# Patient Record
Sex: MALE | Race: BLACK OR AFRICAN AMERICAN | Employment: FULL TIME | URBAN - METROPOLITAN AREA
[De-identification: names, ages, dates, MRNs, and addresses within clinical notes are randomized per-mention and may not be internally consistent; named-entity substitution may affect disease eponyms.]

---

## 2022-05-27 ENCOUNTER — OFFICE VISIT (OUTPATIENT)
Dept: FAMILY MEDICINE CLINIC | Age: 32
End: 2022-05-27
Payer: COMMERCIAL

## 2022-05-27 VITALS
HEART RATE: 74 BPM | BODY MASS INDEX: 17.89 KG/M2 | WEIGHT: 114 LBS | DIASTOLIC BLOOD PRESSURE: 74 MMHG | SYSTOLIC BLOOD PRESSURE: 120 MMHG | HEIGHT: 67 IN

## 2022-05-27 DIAGNOSIS — R10.9 ABDOMINAL CRAMPING: ICD-10-CM

## 2022-05-27 DIAGNOSIS — Z13.1 SCREENING FOR DIABETES MELLITUS: ICD-10-CM

## 2022-05-27 DIAGNOSIS — Z78.9 VEGAN DIET: ICD-10-CM

## 2022-05-27 DIAGNOSIS — R10.13 EPIGASTRIC PAIN: Primary | ICD-10-CM

## 2022-05-27 DIAGNOSIS — Z13.220 SCREENING CHOLESTEROL LEVEL: ICD-10-CM

## 2022-05-27 DIAGNOSIS — Z57.9 OCCUPATIONAL EXPOSURE IN WORKPLACE: ICD-10-CM

## 2022-05-27 DIAGNOSIS — R53.83 FATIGUE, UNSPECIFIED TYPE: ICD-10-CM

## 2022-05-27 PROBLEM — D57.1 SICKLE CELL DISEASE WITHOUT CRISIS (HCC): Status: ACTIVE | Noted: 2022-05-27

## 2022-05-27 PROBLEM — D57.1 SICKLE CELL DISEASE WITHOUT CRISIS (HCC): Status: RESOLVED | Noted: 2022-05-27 | Resolved: 2022-05-27

## 2022-05-27 PROCEDURE — 1036F TOBACCO NON-USER: CPT | Performed by: FAMILY MEDICINE

## 2022-05-27 PROCEDURE — G8427 DOCREV CUR MEDS BY ELIG CLIN: HCPCS | Performed by: FAMILY MEDICINE

## 2022-05-27 PROCEDURE — G8419 CALC BMI OUT NRM PARAM NOF/U: HCPCS | Performed by: FAMILY MEDICINE

## 2022-05-27 PROCEDURE — 99205 OFFICE O/P NEW HI 60 MIN: CPT | Performed by: FAMILY MEDICINE

## 2022-05-27 SDOH — HEALTH STABILITY - PHYSICAL HEALTH: OCCUPATIONAL EXPOSURE TO UNSPECIFIED RISK FACTOR: Z57.9

## 2022-05-27 ASSESSMENT — PATIENT HEALTH QUESTIONNAIRE - PHQ9
SUM OF ALL RESPONSES TO PHQ QUESTIONS 1-9: 0
2. FEELING DOWN, DEPRESSED OR HOPELESS: 0
SUM OF ALL RESPONSES TO PHQ9 QUESTIONS 1 & 2: 0
1. LITTLE INTEREST OR PLEASURE IN DOING THINGS: 0
SUM OF ALL RESPONSES TO PHQ QUESTIONS 1-9: 0

## 2022-05-27 NOTE — PATIENT INSTRUCTIONS
PLEASE BRING YOUR MEDICATIONS TO ALL APPOINTMENTS      Please check MY CHART for test results. If you have forgotten your password, please call [phone]. The diagnoses and medications listed in this after visit summary may not be up to date. Please check MY CHART in 28-48 hours for possible corrections. Late cancellation policy: So that we can better accommodate people who are sick, please give our office 24 hour notice for an appointment cancellation. Missed appointments: Your care is very important to us. It is important that you keep your scheduled appointments. Multiple missed appointments will lead to a dismissal from the office. Patients arriving late will be worked into the schedule as time permits, with patients arriving on time taken as scheduled. Late arriving patients are more than welcome to wait or reschedule their appointments. Please allow 5-7 business days for paperwork to be processed. HERE ARE SOME LIFE CHANGING TIPS      1. Take your blood pressure medications at bedtime to reduce your chance of heart attack or stroke  2. Fever in kids:  Give both Tylenol and Ibuprofen at the same time rather than staggering them   3. Follow these tips to reduce childhood obesity: Reduce unnecessary exposure to antibiotics, consume whole milk instead of skim milk, watch public TV instead of regular TV (less exposure to junk food commercials), and reduce traumatic experiences. 4. 1 egg per day is good for your heart  5. Alternate day fasting does promote weight loss. Skipping breakfast increases your risk of obesity  6. Artificially sweetened drinks increase all cause mortality (strokes, body mass index, cardiovascular disease)  7. Kale consumption can reduce onset of dementia  8. Walking at least 8000 steps per day and resistance exercise 2-3 x per week are good for your heart  9. Brushing teeth 3 times per day can decrease chance of getting diabetes  10.  Antibiotic use is associated with a lifetime increased risk of breast cancer and heart disease.

## 2022-05-27 NOTE — PROGRESS NOTES
Patient ID: Trudy Clarke 1990    . Chief Complaint   Patient presents with   [address]     concerns about sickle cell, exposed to a chemical    Other     [facility]  did not bring in form    Fatigue         HPI     Fatigue: Ongoing for years. States he was exposed to chemicals at work in 2016. Since then he has been tired. His case was not accepted through [employer]. He has since gone to a detox center and he thinks this helped him with his fatigue. 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 Second product he was exposed to is Cidex  Opa solution (active ingredient ortho-phthaldehyde). See material safety data sheet scanned. GI problem has had some GI discomfort and cramping associated with some shortness of breath. He started taking probiotics about 2 weeks ago and his symptoms have resolved. Concern for sickle cell trait: He wonders if he has sickle cell. Neither his parents nor grandparents have sickle cell. Review of Systems    Patient Active Problem List   Diagnosis   (none) - all problems resolved or deleted       No past surgical history on file. Family History   Problem Relation Age of Onset    Hypertension Father     Asthma Maternal Grandmother        No current outpatient medications on file prior to visit. No current facility-administered medications on file prior to visit. Objective:         Physical Exam  Constitutional:       General: He is not in acute distress. Appearance: He is underweight. HENT:      Head: Normocephalic and atraumatic. Right Ear: Hearing, tympanic membrane and external ear normal.      Left Ear: Hearing, tympanic membrane and external ear normal.      Nose: No mucosal edema or rhinorrhea.       Mouth/Throat:      Pharynx: No oropharyngeal exudate or posterior oropharyngeal erythema. Tonsils: No tonsillar abscesses. Eyes:      General: Lids are normal.      Conjunctiva/sclera: Conjunctivae normal.   Neck:      Thyroid: No thyromegaly. Trachea: No tracheal deviation. Cardiovascular:      Rate and Rhythm: Normal rate and regular rhythm. Heart sounds: Normal heart sounds, S1 normal and S2 normal. No murmur heard. No gallop. Pulmonary:      Effort: Pulmonary effort is normal. No respiratory distress. Breath sounds: Normal breath sounds. No wheezing or rales. Abdominal:      Palpations: Abdomen is soft. There is no mass. Tenderness: There is no abdominal tenderness. Musculoskeletal:      Right lower leg: No edema. Left lower leg: No edema. Skin:     General: Skin is warm and dry. Neurological:      Mental Status: He is oriented to person, place, and time. Psychiatric:         Speech: Speech normal.         Behavior: Behavior normal.         Thought Content: Thought content normal.       Vitals:    05/27/22 1118   BP: 120/74   Site: Left Upper Arm   Position: Sitting   Cuff Size: Medium Adult   Pulse: 74   Weight: 114 lb (51.7 kg)   Height: 5' 7\" (1.702 m)     Body mass index is 17.85 kg/m². Wt Readings from Last 3 Encounters:   05/27/22 114 lb (51.7 kg)     BP Readings from Last 3 Encounters:   05/27/22 120/74          No results found for this visit on 05/27/22. The ASCVD Risk score (Andres Olson, et al., 2013) failed to calculate for the following reasons: The 2013 ASCVD risk score is only valid for ages 36 to 78  Lab Review   No results found for any previous visit. Assessment:       Diagnosis Orders   1. Epigastric pain     2. Occupational exposure in workplace     3. Abdominal cramping     4. Screening cholesterol level  Lipid Panel   5. Screening for diabetes mellitus  Glucose, Fasting    Hemoglobin A1C   6.  Fatigue, unspecified type  Vitamin B12 & Folate    Vitamin D 25 Hydroxy    TSH with Reflex    Comprehensive Metabolic Panel    CBC with Auto Differential   7. Vegan diet             Plan:      If his symptoms are improving with the probiotics, then continue taking them. Since this just been a short time since he has been taking probiotics, we can check him again in 3 to 4 months to see if he still feeling better. I did review his emergency room visits at [facility] and they were reassuring    I reviewed the material safety data sheets. What ever exposure he had would have been a long time ago when if he was having respiratory symptoms he should have removed himself from the exposure at that time. There is no mention of long-term effects of exposure in the material safety data sheet    I reviewed the hair sample readings. I am not with familiar with how to interpret this type of information. However nothing is registering as high in regards to the toxic elements. Patient is of [place] ethnicity and is vegan and is rather thin. I may discuss nutritional challenges at her next visit. Time to see patient and review the information he brought in his 1 hour.               [unfilled]

## 2022-08-12 ENCOUNTER — OFFICE VISIT (OUTPATIENT)
Dept: FAMILY MEDICINE CLINIC | Age: 32
End: 2022-08-12
Payer: COMMERCIAL

## 2022-08-12 VITALS
BODY MASS INDEX: 17.2 KG/M2 | OXYGEN SATURATION: 98 % | WEIGHT: 109.8 LBS | TEMPERATURE: 98.9 F | DIASTOLIC BLOOD PRESSURE: 58 MMHG | SYSTOLIC BLOOD PRESSURE: 102 MMHG | HEART RATE: 84 BPM

## 2022-08-12 DIAGNOSIS — G89.29 CHRONIC ABDOMINAL PAIN: Primary | ICD-10-CM

## 2022-08-12 DIAGNOSIS — R10.9 CHRONIC ABDOMINAL PAIN: Primary | ICD-10-CM

## 2022-08-12 DIAGNOSIS — Z78.9 VEGAN DIET: ICD-10-CM

## 2022-08-12 DIAGNOSIS — R63.4 UNINTENTIONAL WEIGHT LOSS: ICD-10-CM

## 2022-08-12 PROCEDURE — G8427 DOCREV CUR MEDS BY ELIG CLIN: HCPCS | Performed by: PHYSICIAN ASSISTANT

## 2022-08-12 PROCEDURE — 1036F TOBACCO NON-USER: CPT | Performed by: PHYSICIAN ASSISTANT

## 2022-08-12 PROCEDURE — 99214 OFFICE O/P EST MOD 30 MIN: CPT | Performed by: PHYSICIAN ASSISTANT

## 2022-08-12 PROCEDURE — G8419 CALC BMI OUT NRM PARAM NOF/U: HCPCS | Performed by: PHYSICIAN ASSISTANT

## 2022-08-12 NOTE — PROGRESS NOTES
[address] [facility] [address]    Chief Complaint   Patient presents with    Abdominal Pain     Upper stomach area     Shortness of Breath       HPI  History was obtained from patient and his wife. This is my first time seeing patient. He is being seen today at [facility]. His PCP is Dr. Lonny Swanson. Safia Armenta is a 27 y.o. male who presents today with complaints of abdominal pain for approximately 3 months. He states that this discomfort is fairly constant. He notices it more prior to meals. He states \"if I do not eat at my normal time, the pain is worse. \"  It also seems worse when he takes any vitamins or supplements so he has stopped all of those entirely, including the most recent probiotic that he started a few months ago. He describes the pain as \"spastic, squeezing and is if stool is moving and then this causes contraction and as is if my breathing is restricted and presses on my lungs and causes me to be short of breath. \"  He points to the epigastric region of the abdomen and states that is where this spastic and squeezing sensation presents itself. He states he is swallowing foods fine. Solids and liquids are not getting stuck in the esophagus. He denies heartburn, increased belching or flatulence. He denies nausea, vomiting, diarrhea, constipation, melena, hematochezia. He admits unintentional weight loss. EMR reviewed. He has lost 5 pounds since his last office visit. 114 pounds in May, 109 pounds today. BMI of 17. His wife states that he eats normal to large amounts of food each day so she does not understand why he continues to lose weight. He is [place] and follows a vegan diet. He has tried to incorporate fish into his diet but it seems to make it worse. He denies URI type symptoms such as nasal congestion, postnasal drip or sore throat. He denies cough, hemoptysis, myalgias or arthralgias, night sweats or fevers.   He has never smoked cigarettes. He denies alcohol use, or illicit drug use. He denies Tylenol, aspirin, NSAID use. He states that he used to have a history of elevated B12 which caused his legs to shake. He stopped taking B12 approximately 3 years ago. He has taken his blood pressure sometimes when at home. He states that when he is sitting, it is sometimes 90/50 but then when he stands up it is elevated. When he feels abdominal pain and lung restriction, he stands up and this also seems to improve his breathing and his pain. 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 He states that he was able to push on this area and make the bulge go away. This helped him feel better. He questions if he could have a hernia. He states he was very fatigued last week. He states \"I have a constant spitting problem that started in 2019. \"  He constantly spits into a cup during the office visit. He states that sometimes his breath smells sweet. He has been monitoring his blood sugars at home. Fasting sugars in the 80s.  2 hours postprandial 170s. Denies polyuria or polydipsia. Normal urine stream.  No dysuria. He had COVID-19 approximately 1 month ago, recovered well. He states he has never had abdominal imaging. He states he has never had an EGD or colonoscopy. No known family history of cancer. Admits anxiousness. No SI/HI. PAST MEDICAL HISTORY  History reviewed. No pertinent past medical history.     FAMILY HISTORY  Family History   Problem Relation Age of Onset    Hypertension Father     Asthma Maternal Grandmother        SOCIAL HISTORY  Social History     Socioeconomic History    Marital status:      Spouse name: None    Number of children: None    Years of education: None    Highest education level: None   Tobacco Use    Smoking status: Never    Smokeless tobacco: Never   Substance and Sexual Activity    Alcohol use: Never    Drug use: Never        SURGICAL HISTORY  History reviewed. No pertinent surgical history. CURRENT MEDICATIONS  No current outpatient medications on file. No current facility-administered medications for this visit. ALLERGIES  Allergies   Allergen Reactions    Gentamicin      Unknown reaction, reaction as a child       PHYSICAL EXAM    BP (!) 102/58   Pulse 84   Temp 98.9 °F (37.2 °C) (Oral)   Wt 109 lb 12.8 oz (49.8 kg)   SpO2 98%   BMI 17.20 kg/m²     Constitutional: Thin and frail in appearance. No acute distress. Appears slightly anxious. Repeatedly spits into a cup that he brought with him. HENT:  Normocephalic, atraumatic  Eyes:  conjunctiva normal, no discharge, no scleral icterus  Neck:  No tenderness, supple  Lymphatic:  No lymphadenopathy noted  Cardiovascular:  Normal heart rate, normal rhythm, no murmurs, gallops or rubs  Thorax & Lungs:  Normal breath sounds, no respiratory distress, no wheezing, no rales, no rhonchi  Abdomen: Thin. No tenderness to palpation. No palpable mass or organomegaly. No distention  Skin:  Warm, dry, no erythema, no rash  Back:  No CVA tenderness  Extremities:  No edema, no tenderness, no cyanosis  Neurologic:  Alert & oriented   Psychiatric:  Affect normal, mood normal    ASSESSMENT & PLAN    Enoc was seen today for abdominal pain and shortness of breath.     Diagnoses and all orders for this visit:    Chronic abdominal pain  -     Cintia Cortez CNP, Gastroenterology, [place]  -     CT ABDOMEN PELVIS WO CONTRAST Additional Contrast? None; Future    Unintentional weight loss  -     CT ABDOMEN PELVIS WO CONTRAST Additional Contrast? None; Future    Vegan diet     CT abdomen and pelvis (patient refuses contrast)  Obtain blood work as ordered by PCP 10 weeks ago  Follow-up with gastroenterology- referral placed  Follow-up with PCP next month as planned  Patient may need to see a nutritionist  ER for any sudden worsening    There are no discontinued medications. No follow-ups on file. Please note that this chart was generated using dragon dictation software. Although every effort was made to ensure the accuracy of this automated transcription, some errors in transcription may have occurred.     Electronically signed by Amy Chan PA-C on 8/12/2022

## 2022-08-15 ENCOUNTER — TELEPHONE (OUTPATIENT)
Dept: GASTROENTEROLOGY | Age: 32
End: 2022-08-15

## 2022-08-15 NOTE — TELEPHONE ENCOUNTER
Called pt. In regards to a referral for abd pain and weight loss.  Made appt for pt to see osmar on / @4pm

## 2022-08-17 ENCOUNTER — APPOINTMENT (OUTPATIENT)
Dept: CT IMAGING | Age: 32
End: 2022-08-17
Payer: COMMERCIAL

## 2022-08-17 ENCOUNTER — HOSPITAL ENCOUNTER (EMERGENCY)
Age: 32
Discharge: HOME | End: 2022-08-17
Payer: COMMERCIAL

## 2022-08-17 ENCOUNTER — APPOINTMENT (OUTPATIENT)
Dept: ULTRASOUND IMAGING | Age: 32
End: 2022-08-17
Payer: COMMERCIAL

## 2022-08-17 VITALS
OXYGEN SATURATION: 100 % | TEMPERATURE: 96.8 F | HEIGHT: 68 IN | BODY MASS INDEX: 16.37 KG/M2 | RESPIRATION RATE: 16 BRPM | WEIGHT: 108 LBS | DIASTOLIC BLOOD PRESSURE: 75 MMHG | SYSTOLIC BLOOD PRESSURE: 114 MMHG | HEART RATE: 85 BPM

## 2022-08-17 DIAGNOSIS — R10.13 ABDOMINAL PAIN, EPIGASTRIC: Primary | ICD-10-CM

## 2022-08-17 LAB
ALBUMIN SERPL-MCNC: 5.3 GM/DL (ref 3.4–5)
ALP BLD-CCNC: 91 IU/L (ref 40–129)
ALT SERPL-CCNC: 19 U/L (ref 10–40)
ANION GAP SERPL CALCULATED.3IONS-SCNC: 10 MMOL/L (ref 4–16)
AST SERPL-CCNC: 21 IU/L (ref 15–37)
BACTERIA: NEGATIVE /HPF
BASOPHILS ABSOLUTE: 0.1 K/CU MM
BASOPHILS RELATIVE PERCENT: 1.1 % (ref 0–1)
BILIRUB SERPL-MCNC: 0.5 MG/DL (ref 0–1)
BILIRUBIN URINE: NEGATIVE MG/DL
BLOOD, URINE: NEGATIVE
BUN BLDV-MCNC: 9 MG/DL (ref 6–23)
CALCIUM SERPL-MCNC: 9.7 MG/DL (ref 8.3–10.6)
CHLORIDE BLD-SCNC: 100 MMOL/L (ref 99–110)
CLARITY: CLEAR
CO2: 24 MMOL/L (ref 21–32)
COLOR: YELLOW
CREAT SERPL-MCNC: 0.8 MG/DL (ref 0.9–1.3)
DIFFERENTIAL TYPE: ABNORMAL
EOSINOPHILS ABSOLUTE: 0.2 K/CU MM
EOSINOPHILS RELATIVE PERCENT: 2.5 % (ref 0–3)
GFR AFRICAN AMERICAN: >60 ML/MIN/1.73M2
GFR NON-AFRICAN AMERICAN: >60 ML/MIN/1.73M2
GLUCOSE BLD-MCNC: 91 MG/DL (ref 70–99)
GLUCOSE, URINE: NEGATIVE MG/DL
HCT VFR BLD CALC: 45.1 % (ref 42–52)
HEMOGLOBIN: 14.7 GM/DL (ref 13.5–18)
IMMATURE NEUTROPHIL %: 0.2 % (ref 0–0.43)
KETONES, URINE: ABNORMAL MG/DL
LEUKOCYTE ESTERASE, URINE: NEGATIVE
LYMPHOCYTES ABSOLUTE: 2.6 K/CU MM
LYMPHOCYTES RELATIVE PERCENT: 30.8 % (ref 24–44)
MAGNESIUM: 2.2 MG/DL (ref 1.8–2.4)
MCH RBC QN AUTO: 31.9 PG (ref 27–31)
MCHC RBC AUTO-ENTMCNC: 32.6 % (ref 32–36)
MCV RBC AUTO: 97.8 FL (ref 78–100)
MONOCYTES ABSOLUTE: 0.7 K/CU MM
MONOCYTES RELATIVE PERCENT: 8.2 % (ref 0–4)
MUCUS: ABNORMAL HPF
NITRITE URINE, QUANTITATIVE: NEGATIVE
NUCLEATED RBC %: 0 %
PDW BLD-RTO: 12.3 % (ref 11.7–14.9)
PH, URINE: 6 (ref 5–8)
PLATELET # BLD: 273 K/CU MM (ref 140–440)
PMV BLD AUTO: 11 FL (ref 7.5–11.1)
POTASSIUM SERPL-SCNC: 3.5 MMOL/L (ref 3.5–5.1)
PROTEIN UA: ABNORMAL MG/DL
RBC # BLD: 4.61 M/CU MM (ref 4.6–6.2)
RBC URINE: 1 /HPF (ref 0–3)
SEGMENTED NEUTROPHILS ABSOLUTE COUNT: 4.8 K/CU MM
SEGMENTED NEUTROPHILS RELATIVE PERCENT: 57.2 % (ref 36–66)
SODIUM BLD-SCNC: 134 MMOL/L (ref 135–145)
SPECIFIC GRAVITY UA: 1.01 (ref 1–1.03)
TOTAL IMMATURE NEUTOROPHIL: 0.02 K/CU MM
TOTAL NUCLEATED RBC: 0 K/CU MM
TOTAL PROTEIN: 8.3 GM/DL (ref 6.4–8.2)
TRICHOMONAS: ABNORMAL /HPF
UROBILINOGEN, URINE: 0.2 MG/DL (ref 0.2–1)
WBC # BLD: 8.3 K/CU MM (ref 4–10.5)
WBC UA: 1 /HPF (ref 0–2)

## 2022-08-17 PROCEDURE — 81001 URINALYSIS AUTO W/SCOPE: CPT

## 2022-08-17 PROCEDURE — 99285 EMERGENCY DEPT VISIT HI MDM: CPT

## 2022-08-17 PROCEDURE — 80053 COMPREHEN METABOLIC PANEL: CPT

## 2022-08-17 PROCEDURE — 96374 THER/PROPH/DIAG INJ IV PUSH: CPT

## 2022-08-17 PROCEDURE — 6360000004 HC RX CONTRAST MEDICATION: Performed by: PHYSICIAN ASSISTANT

## 2022-08-17 PROCEDURE — 76705 ECHO EXAM OF ABDOMEN: CPT

## 2022-08-17 PROCEDURE — 85025 COMPLETE CBC W/AUTO DIFF WBC: CPT

## 2022-08-17 PROCEDURE — 6360000002 HC RX W HCPCS: Performed by: PHYSICIAN ASSISTANT

## 2022-08-17 PROCEDURE — 74177 CT ABD & PELVIS W/CONTRAST: CPT

## 2022-08-17 PROCEDURE — C9113 INJ PANTOPRAZOLE SODIUM, VIA: HCPCS | Performed by: PHYSICIAN ASSISTANT

## 2022-08-17 PROCEDURE — 83735 ASSAY OF MAGNESIUM: CPT

## 2022-08-17 RX ORDER — PANTOPRAZOLE SODIUM 40 MG/10ML
40 INJECTION, POWDER, LYOPHILIZED, FOR SOLUTION INTRAVENOUS ONCE
Status: COMPLETED | OUTPATIENT
Start: 2022-08-17 | End: 2022-08-17

## 2022-08-17 RX ORDER — PANTOPRAZOLE SODIUM 20 MG/1
20 TABLET, DELAYED RELEASE ORAL
Qty: 30 TABLET | Refills: 0 | Status: SHIPPED | OUTPATIENT
Start: 2022-08-17 | End: 2022-08-22 | Stop reason: ALTCHOICE

## 2022-08-17 RX ADMIN — IOPAMIDOL 75 ML: 755 INJECTION, SOLUTION INTRAVENOUS at 22:01

## 2022-08-17 RX ADMIN — PANTOPRAZOLE SODIUM 40 MG: 40 INJECTION, POWDER, FOR SOLUTION INTRAVENOUS at 21:33

## 2022-08-17 ASSESSMENT — PAIN - FUNCTIONAL ASSESSMENT: PAIN_FUNCTIONAL_ASSESSMENT: NONE - DENIES PAIN

## 2022-08-18 ENCOUNTER — TELEPHONE (OUTPATIENT)
Dept: FAMILY MEDICINE CLINIC | Age: 32
End: 2022-08-18

## 2022-08-18 NOTE — ED NOTES
US ABDOMEN LIMITED [[id number]]    Status: Final result       Order Providers    Timo DumontManuel KEYONNA Mack DO            Signed by    Signed Date/Time Phone Pager   Andrew Mccormickmisael 8/17/2022 10:50 [phone]      Reading Providers    Read Date Phone Pager   Tommie Rose Aug 17, 2022 10:50 [phone]        US ABDOMEN LIMITED: Patient Communication     Released  Not seen     Radiation Dose Estimates    No radiation information found for this patient  Narrative   EXAMINATION:   RIGHT UPPER QUADRANT ULTRASOUND       8/17/2022 7:03 pm       COMPARISON:   None.       HISTORY:   ORDERING SYSTEM PROVIDED HISTORY: RUQ pain   TECHNOLOGIST PROVIDED HISTORY:   Reason for exam:->RUQ pain       FINDINGS:   LIVER:  The liver demonstrates normal echogenicity without evidence of   intrahepatic biliary ductal dilatation.       BILIARY SYSTEM:  Gallbladder is partially contracted.  No gallstones, wall   thickening, or pericholecystic fluid is present.  Gallbladder wall measures 2   mm. Acie Kingdom sign is absent.       Common bile duct is within normal limits measuring 1.4 mm.       RIGHT KIDNEY: Minimal fullness of the right pelvocaliceal system is present. No intrarenal calculi are present.  Right kidney appears malpositioned, and   can be further evaluated on the scheduled CT scan.       PANCREAS:  Visualized portions of the pancreas are unremarkable.       OTHER: No evidence of right upper quadrant ascites.           Impression   1. No evidence of cholelithiasis or cholecystitis   2. Minimal fullness of the right pelvocaliceal system, without evidence of an   intrarenal calculus.  Correlation with the scheduled CT scan is recommended   2, to evaluate for hydronephrosis and potentially a ureteral calculus.           Dottie Veliz RN  08/17/22 9812

## 2022-08-18 NOTE — ED PROVIDER NOTES
Not on file   Housing Stability: Not on file     No current facility-administered medications for this encounter. Current Outpatient Medications   Medication Sig Dispense Refill    pantoprazole (PROTONIX) 20 MG tablet Take 1 tablet by mouth every morning (before breakfast) 30 tablet 0     Allergies   Allergen Reactions    Gentamicin      Unknown reaction, reaction as a child       Nursing Notes Reviewed    Physical Exam:  ED Triage Vitals [08/17/22 0996]   Enc Vitals Group      /76      Heart Rate 92      Resp 20      Temp 98.2 °F (36.8 °C)      Temp Source Oral      SpO2 100 %      Weight       Height       Head Circumference       Peak Flow       Pain Score       Pain Loc       Pain Edu? Excl. in [address]? General :Patient is awake alert oriented person place and time no acute distress nontoxic appearing  HEENT: Pupils are equally round and reactive to light extraocular motors are intact conjunctivae clear sclerae white there is no injection no icterus. Nose without any rhinorrhea or epistaxis. Oral mucosa is moist no exudate buccal mucosa shows no ulcerations. Uvula is midline    Neck: Neck is supple full range of motion trachea midline thyroid nonpalpable  Cardiac: Heart regular rate rhythm no murmurs rubs clicks or gallops  Lungs: Lungs are clear to auscultation there is no wheezing rhonchi or rales. There is no use of accessory muscles no nasal flaring identified. Chest wall: There is no tenderness to palpation over the chest wall or over ribs  Abdomen: Abdomen is soft nontender nondistended. There is no firm or pulsatile masses no rebound rigidity or guarding negative Ro's negative McBurney, no peritoneal signs  Suprapubic:  there is no tenderness to palpation over the external bladder   Musculoskeletal: 5 out of 5 strength in all 4 extremities full flexion extension abduction and adduction supination pronation of all extremities and all digits. No obvious muscle atrophy is noted.  No focal muscle deficits are appreciated  Dermatology: Skin is warm and dry there is no obvious abscesses lacerations or lesions noted  Psych: Mentation is grossly normal cognition is grossly normal. Affect is appropriate  Neuro: Motor intact sensory intact cranial nerves II through XII are intact level of consciousness is normal cerebellar function is normal reflexes are grossly normal. No evidence of incontinence or loss of bowel or bladder no saddle anesthesia noted Lymphatic: There is no submandibular or cervical adenopathy appreciated.         I have reviewed and interpreted all of the currently available lab results from this visit (if applicable):  Results for orders placed or performed during the hospital encounter of 08/17/22   CBC with Auto Differential   Result Value Ref Range    WBC 8.3 4.0 - 10.5 K/CU MM    RBC 4.61 4.6 - 6.2 M/CU MM    Hemoglobin 14.7 13.5 - 18.0 GM/DL    Hematocrit 45.1 42 - 52 %    MCV 97.8 78 - 100 FL    MCH 31.9 (H) 27 - 31 PG    MCHC 32.6 32.0 - 36.0 %    RDW 12.3 11.7 - 14.9 %    Platelets 787 728 - 837 K/CU MM    MPV 11.0 7.5 - 11.1 FL    Differential Type AUTOMATED DIFFERENTIAL     Segs Relative 57.2 36 - 66 %    Lymphocytes % 30.8 24 - 44 %    Monocytes % 8.2 (H) 0 - 4 %    Eosinophils % 2.5 0 - 3 %    Basophils % 1.1 (H) 0 - 1 %    Segs Absolute 4.8 K/CU MM    Lymphocytes Absolute 2.6 K/CU MM    Monocytes Absolute 0.7 K/CU MM    Eosinophils Absolute 0.2 K/CU MM    Basophils Absolute 0.1 K/CU MM    Nucleated RBC % 0.0 %    Total Nucleated RBC 0.0 K/CU MM    Total Immature Neutrophil 0.02 K/CU MM    Immature Neutrophil % 0.2 0 - 0.43 %   Comprehensive Metabolic Panel w/ Reflex to MG   Result Value Ref Range    Sodium 134 (L) 135 - 145 MMOL/L    Potassium 3.5 3.5 - 5.1 MMOL/L    Chloride 100 99 - 110 mMol/L    CO2 24 21 - 32 MMOL/L    BUN 9 6 - 23 MG/DL    Creatinine 0.8 (L) 0.9 - 1.3 MG/DL    Glucose 91 70 - 99 MG/DL    Calcium 9.7 8.3 - 10.6 MG/DL    Albumin 5.3 (H) 3.4 - 5.0 GM/DL    Total Protein 8.3 (H) 6.4 - 8.2 GM/DL    Total Bilirubin 0.5 0.0 - 1.0 MG/DL    ALT 19 10 - 40 U/L    AST 21 15 - 37 IU/L    Alkaline Phosphatase 91 40 - 129 IU/L    GFR Non-African American >60 >60 mL/min/1.73m2    GFR African American >60 >60 mL/min/1.73m2    Anion Gap 10 4 - 16   Urinalysis   Result Value Ref Range    Color, UA YELLOW YELLOW    Clarity, UA CLEAR CLEAR    Glucose, Urine NEGATIVE NEGATIVE MG/DL    Bilirubin Urine NEGATIVE NEGATIVE MG/DL    Ketones, Urine TRACE (A) NEGATIVE MG/DL    Specific Gravity, UA 1.010 1.001 - 1.035    Blood, Urine NEGATIVE NEGATIVE    pH, Urine 6.0 5.0 - 8.0    Protein, UA TRACE (A) NEGATIVE MG/DL    Urobilinogen, Urine 0.2 0.2 - 1.0 MG/DL    Nitrite Urine, Quantitative NEGATIVE NEGATIVE    Leukocyte Esterase, Urine NEGATIVE NEGATIVE    RBC, UA 1 0 - 3 /HPF    WBC, UA 1 0 - 2 /HPF    Bacteria, UA NEGATIVE NEGATIVE /HPF    Mucus, UA RARE (A) NEGATIVE HPF    Trichomonas, UA NONE SEEN NONE SEEN /HPF   Magnesium   Result Value Ref Range    Magnesium 2.2 1.8 - 2.4 mg/dl      Radiographs (if obtained):  [] The following radiograph was interpreted by myself in the absence of a radiologist:   [] Radiologist's Report Reviewed:  US ABDOMEN LIMITED   Final Result   1. No evidence of cholelithiasis or cholecystitis   2. Minimal fullness of the right pelvocaliceal system, without evidence of an   intrarenal calculus. Correlation with the scheduled CT scan is recommended   2, to evaluate for hydronephrosis and potentially a ureteral calculus. CT ABDOMEN PELVIS W IV CONTRAST Additional Contrast? None   Final Result   1. No acute intra-abdominal abnormality. EKG (if obtained):   Please See Note of attending physician for EKG interpretation. Chart review shows recent radiograph(s):  No results found.     MDM:     Interventions given this visit:   Orders Placed This Encounter   Medications    pantoprazole (PROTONIX) injection 40 mg    iopamidol (ISOVUE-370) 76 % injection 75 mL    pantoprazole (PROTONIX) 20 MG tablet     Sig: Take 1 tablet by mouth every morning (before breakfast)     Dispense:  30 tablet     Refill:  0       Patient presents today with abdominal pain. Abdominal exam is  /Nonsurgical/nonemergent/nonacute. Lab work including:    CBC shows no marked leukocytosis. No anemia, bandemia. Metabolic panel shows no abnormalities to account for patient's symptoms. Lipase is within normal limits. Urinalysis shows no sign of infection. Initial and repeat serial examinations are nonsurgical    Right upper quadrant ultrasound negative. CT abdomen pelvis showed no evidence of acute ureterolithiasis, bowel obstruction, bowel ischemia, deep tissue abscess, or other intra-abdominal or pelvic emergency. In addition, other causes were considered including appendicitis, urinary tract infection, aortic dissection, mesenteric ischemia, as well as other surgical/malignant intra-abdominal processes, there is no evidence for these other possible processes at this time, based on patient's history, presentation, physical, and current state. I estimate there is LOW risk for BACTERIAL MENINGITIS, SUBARACHNOID HEMORRHAGE, ISCHEMIC OR HEMORRHAGE CVA, or ACUTE CORONARY SYNDROME, ACUTE APPENDICITIS, BOWEL OBSTRUCTION, CHOLECYSTITIS, RUPTURED DIVERTICULITIS, INCARCERATED HERNIA, HEMMORHAGIC PANCREATITIS, or PERFORATED BOWEL/ULCER, ECTOPIC PREGNANCY, OVARIAN TORSION or TUBO-OVARIAN ABSCESS    Likely gastroenteritis versus dyspepsia versus gastritis. Patient agrees to return emergency department if symptoms worsen or any new symptoms develop. I independently managed patient today in the ED. /78   Pulse 85   Temp 96.8 °F (36 °C) (Infrared)   Resp 16   Ht 5' 8\" (1.727 m)   Wt 108 lb (49 kg)   SpO2 99%   BMI 16.42 kg/m²       Clinical Impression:  1.  Abdominal pain, epigastric        Disposition referral (if applicable):  Della Storey MD  [address] [place] [address]  [phone]    In 2 days    Disposition medications (if applicable):  New Prescriptions    PANTOPRAZOLE (PROTONIX) 20 MG TABLET    Take 1 tablet by mouth every morning (before breakfast)         Comment: Please note this report has been produced using speech recognition software and may contain errors related to that system including errors in grammar, punctuation, and spelling, as well as words and phrases that may be inappropriate. If there are any questions or concerns please feel free to contact the dictating provider for clarification.       Jaime Toribio, [id number] [place] [address]  08/17/22 2030

## 2022-08-18 NOTE — TELEPHONE ENCOUNTER
Contacted by call center in regards to patient wanting to be scene for ED Follow Up. Patient was in [facility] on 08/17/2022 for increased abdominal pain. Patient states that \"midepigastric/right upper quadrant abdominal pain. Intermittent ongoing over the last several months patient states he is lost a lot of weight secondary to this\" Patient has appointment to follow up with [facility] [place] [facility] 08/30.  Patient also has appointment for 08/22 with PCP- Would like to be scene earlier- Please advise

## 2022-08-18 NOTE — TELEPHONE ENCOUNTER
Called patient to follow up and schedule same day appointment. Reviewed symptoms with patient- He stated that when he has an episode of abdominal pain- it is located in the upper quadrants of his abdomin. Patient states that he feels what feel like contractions/spasm in his abdomin. These contractions lead to increased heart rate, increased shortness of breath, patient states that he has to stand through the episode to decrease his heart rate and ease his breathing- Patient states that while he is a vegan, he does eat a balanced diet and drinks 48oz of water daily on average. Patient denies any vomiting or diarrhea. Patient also states that he is not sure if it is related but he will occasionally get a sweet taste in his mouth/his breath will smell sweet. Patient also states that he occasionally will have white on his tongue- Patient unsure if either symptom is related or correlated.

## 2022-08-18 NOTE — ED NOTES
Patient reports increased salivation onset 2019, spits in cup. Wife at bedside.      Magdalena Cunningham RN  08/17/22 0370

## 2022-08-19 ENCOUNTER — HOSPITAL ENCOUNTER (OUTPATIENT)
Age: 32
Discharge: HOME | End: 2022-08-19
Payer: COMMERCIAL

## 2022-08-19 PROBLEM — R63.6 UNDERWEIGHT: Status: ACTIVE | Noted: 2022-08-19

## 2022-08-19 PROBLEM — Z78.9 VEGAN: Status: ACTIVE | Noted: 2022-08-19

## 2022-08-19 LAB
ALBUMIN SERPL-MCNC: 5.2 GM/DL (ref 3.4–5)
ALP BLD-CCNC: 90 IU/L (ref 40–129)
ALT SERPL-CCNC: 18 U/L (ref 10–40)
ANION GAP SERPL CALCULATED.3IONS-SCNC: 14 MMOL/L (ref 4–16)
AST SERPL-CCNC: 21 IU/L (ref 15–37)
BASOPHILS ABSOLUTE: 0.1 K/CU MM
BASOPHILS RELATIVE PERCENT: 0.8 % (ref 0–1)
BILIRUB SERPL-MCNC: 0.8 MG/DL (ref 0–1)
BUN BLDV-MCNC: 8 MG/DL (ref 6–23)
CALCIUM SERPL-MCNC: 9.9 MG/DL (ref 8.3–10.6)
CHLORIDE BLD-SCNC: 104 MMOL/L (ref 99–110)
CHOLESTEROL: 142 MG/DL
CO2: 24 MMOL/L (ref 21–32)
CREAT SERPL-MCNC: 0.7 MG/DL (ref 0.9–1.3)
DIFFERENTIAL TYPE: ABNORMAL
EOSINOPHILS ABSOLUTE: 0.3 K/CU MM
EOSINOPHILS RELATIVE PERCENT: 3.8 % (ref 0–3)
ESTIMATED AVERAGE GLUCOSE: 100 MG/DL
FOLATE: 5.1 NG/ML (ref 3.1–17.5)
GFR AFRICAN AMERICAN: >60 ML/MIN/1.73M2
GFR NON-AFRICAN AMERICAN: >60 ML/MIN/1.73M2
GLUCOSE BLD-MCNC: 84 MG/DL (ref 70–99)
GLUCOSE FASTING: 85 MG/DL (ref 70–99)
HBA1C MFR BLD: 5.1 % (ref 4.2–6.3)
HCT VFR BLD CALC: 46.9 % (ref 42–52)
HDLC SERPL-MCNC: 46 MG/DL
HEMOGLOBIN: 14.7 GM/DL (ref 13.5–18)
IMMATURE NEUTROPHIL %: 0.3 % (ref 0–0.43)
LDL CHOLESTEROL CALCULATED: 80 MG/DL
LYMPHOCYTES ABSOLUTE: 2.3 K/CU MM
LYMPHOCYTES RELATIVE PERCENT: 30.2 % (ref 24–44)
MCH RBC QN AUTO: 31.5 PG (ref 27–31)
MCHC RBC AUTO-ENTMCNC: 31.3 % (ref 32–36)
MCV RBC AUTO: 100.6 FL (ref 78–100)
MONOCYTES ABSOLUTE: 0.6 K/CU MM
MONOCYTES RELATIVE PERCENT: 8 % (ref 0–4)
NUCLEATED RBC %: 0 %
PDW BLD-RTO: 12.3 % (ref 11.7–14.9)
PLATELET # BLD: 268 K/CU MM (ref 140–440)
PMV BLD AUTO: 11.7 FL (ref 7.5–11.1)
POTASSIUM SERPL-SCNC: 4.3 MMOL/L (ref 3.5–5.1)
RBC # BLD: 4.66 M/CU MM (ref 4.6–6.2)
SEGMENTED NEUTROPHILS ABSOLUTE COUNT: 4.3 K/CU MM
SEGMENTED NEUTROPHILS RELATIVE PERCENT: 56.9 % (ref 36–66)
SODIUM BLD-SCNC: 142 MMOL/L (ref 135–145)
TOTAL IMMATURE NEUTOROPHIL: 0.02 K/CU MM
TOTAL NUCLEATED RBC: 0 K/CU MM
TOTAL PROTEIN: 7.6 GM/DL (ref 6.4–8.2)
TRIGL SERPL-MCNC: 79 MG/DL
TSH HIGH SENSITIVITY: 3.81 UIU/ML (ref 0.27–4.2)
VITAMIN B-12: 234 PG/ML (ref 211–911)
VITAMIN D 25-HYDROXY: 11.1 NG/ML
WBC # BLD: 7.5 K/CU MM (ref 4–10.5)

## 2022-08-19 PROCEDURE — 84443 ASSAY THYROID STIM HORMONE: CPT

## 2022-08-19 PROCEDURE — 85025 COMPLETE CBC W/AUTO DIFF WBC: CPT

## 2022-08-19 PROCEDURE — 36415 COLL VENOUS BLD VENIPUNCTURE: CPT

## 2022-08-19 PROCEDURE — 82607 VITAMIN B-12: CPT

## 2022-08-19 PROCEDURE — 80061 LIPID PANEL: CPT

## 2022-08-19 PROCEDURE — 82306 VITAMIN D 25 HYDROXY: CPT

## 2022-08-19 PROCEDURE — 80053 COMPREHEN METABOLIC PANEL: CPT

## 2022-08-19 PROCEDURE — 83036 HEMOGLOBIN GLYCOSYLATED A1C: CPT

## 2022-08-19 PROCEDURE — 82746 ASSAY OF FOLIC ACID SERUM: CPT

## 2022-08-19 PROCEDURE — 82947 ASSAY GLUCOSE BLOOD QUANT: CPT

## 2022-08-22 ENCOUNTER — OFFICE VISIT (OUTPATIENT)
Dept: FAMILY MEDICINE CLINIC | Age: 32
End: 2022-08-22
Payer: COMMERCIAL

## 2022-08-22 VITALS
DIASTOLIC BLOOD PRESSURE: 80 MMHG | SYSTOLIC BLOOD PRESSURE: 110 MMHG | HEART RATE: 103 BPM | OXYGEN SATURATION: 99 % | WEIGHT: 109 LBS | TEMPERATURE: 98.1 F | BODY MASS INDEX: 16.52 KG/M2 | HEIGHT: 68 IN

## 2022-08-22 DIAGNOSIS — R00.2 PALPITATIONS: ICD-10-CM

## 2022-08-22 DIAGNOSIS — R63.6 UNDERWEIGHT: ICD-10-CM

## 2022-08-22 DIAGNOSIS — R63.4 WEIGHT LOSS: ICD-10-CM

## 2022-08-22 DIAGNOSIS — Z86.16 HISTORY OF 2019 NOVEL CORONAVIRUS DISEASE (COVID-19): ICD-10-CM

## 2022-08-22 DIAGNOSIS — Z78.9 VEGAN: ICD-10-CM

## 2022-08-22 DIAGNOSIS — R10.13 EPIGASTRIC PAIN: Primary | ICD-10-CM

## 2022-08-22 DIAGNOSIS — E55.9 VITAMIN D DEFICIENCY: ICD-10-CM

## 2022-08-22 DIAGNOSIS — Z11.59 NEED FOR HEPATITIS C SCREENING TEST: ICD-10-CM

## 2022-08-22 LAB
HEPATITIS C ANTIBODY INTERPRETATION: NORMAL
LIPASE: 33 U/L (ref 13–60)

## 2022-08-22 PROCEDURE — 1036F TOBACCO NON-USER: CPT | Performed by: FAMILY MEDICINE

## 2022-08-22 PROCEDURE — 99214 OFFICE O/P EST MOD 30 MIN: CPT | Performed by: FAMILY MEDICINE

## 2022-08-22 PROCEDURE — G8419 CALC BMI OUT NRM PARAM NOF/U: HCPCS | Performed by: FAMILY MEDICINE

## 2022-08-22 PROCEDURE — G8427 DOCREV CUR MEDS BY ELIG CLIN: HCPCS | Performed by: FAMILY MEDICINE

## 2022-08-22 RX ORDER — MELATONIN
1000 DAILY
Qty: 90 TABLET | Refills: 3 | Status: SHIPPED | OUTPATIENT
Start: 2022-08-22

## 2022-08-22 SDOH — ECONOMIC STABILITY: FOOD INSECURITY: WITHIN THE PAST 12 MONTHS, YOU WORRIED THAT YOUR FOOD WOULD RUN OUT BEFORE YOU GOT MONEY TO BUY MORE.: NEVER TRUE

## 2022-08-22 SDOH — ECONOMIC STABILITY: FOOD INSECURITY: WITHIN THE PAST 12 MONTHS, THE FOOD YOU BOUGHT JUST DIDN'T LAST AND YOU DIDN'T HAVE MONEY TO GET MORE.: NEVER TRUE

## 2022-08-22 ASSESSMENT — SOCIAL DETERMINANTS OF HEALTH (SDOH): HOW HARD IS IT FOR YOU TO PAY FOR THE VERY BASICS LIKE FOOD, HOUSING, MEDICAL CARE, AND HEATING?: NOT VERY HARD

## 2022-08-22 NOTE — PATIENT INSTRUCTIONS
BRING YOUR MEDICATION BOTTLES TO ALL APPOINTMENTS    Check MY CHART for test results. If you have forgotten your password, call [phone]. The diagnoses and medications listed in this after visit summary may not be up to date. Check MY CHART in 28-48 hours forcorrections. Late cancellation policy: So that we can better accommodate people who are sick, please give our office 24 hour notice for an appointment cancellation. Missed appointments: Your care is very important to us. It is important that you keep your scheduled appointments. Multiple missed appointments will lead to a dismissal from the office. Patients arriving late will be worked into the schedule as time permits, with patients arriving on time taken as scheduled. Late arriving patients are more than welcome to wait or reschedule their appointments. Please allow 5-7 business days for paperwork to be processed.

## 2022-08-22 NOTE — PROGRESS NOTES
Patient ID: Rachael Napier 1990    . Chief Complaint   Patient presents with    ED Follow-up     Abdominal pain epigastric         HPI    Abdominal pain: x 3 weeks, but getting better since last week. Has not taken the PPI that was prescribed from the ER. Tried gluten free diet and it didn't make any difference. Denies alcohol or NSAID use. Is wondering if he could see hyperbaric specialist who could treat him. Denies any blood in the stools. Denies any vomiting of blood. Underweight: He knows he is underweight. He has recently incorporated fish in his diet. He is now trying to gain weight. He has been a vegan for a long time. He eats a lot of fruits and vegetables. Palpitations: Occur when his belly pain flares up. When his abdominal pain is not there, the palpitations are gone. History of COVID: Had COVID last month again. Was barely ill but is wondering if the abdominal pain he now has related to [place]. Review of Systems    Patient Active Problem List   Diagnosis    Vegan    Underweight    History of 2019 novel coronavirus disease (COVID-19)       No past surgical history on file. Family History   Problem Relation Age of Onset    Hypertension Father     Asthma Maternal Grandmother        Current Outpatient Medications on File Prior to Visit   Medication Sig Dispense Refill    omeprazole (PRILOSEC) 2 MG/ML SUSP 2 mg/mL oral suspension Take 10 mLs by mouth Daily 6000 mL 0     No current facility-administered medications on file prior to visit. Objective:         Physical Exam  Constitutional:       General: He is not in acute distress. Appearance: He is cachectic. He is not toxic-appearing. HENT:      Head: Normocephalic and atraumatic. Right Ear: Hearing, tympanic membrane and external ear normal.      Left Ear: Hearing, tympanic membrane and external ear normal.      Nose: No mucosal edema or rhinorrhea.       Mouth/Throat:      Pharynx: No Hematocrit 08/19/2022 46.9     MCV 08/19/2022 100.6 (A)    MCH 08/19/2022 31.5 (A)    MCHC 08/19/2022 31.3 (A)    RDW 08/19/2022 12.3     Platelets 43/51/1529 268     MPV 08/19/2022 11.7 (A)    Differential Type 08/19/2022 AUTOMATED DIFFERENTIAL     Segs Relative 08/19/2022 56.9     Lymphocytes % 08/19/2022 30.2     Monocytes % 08/19/2022 8.0 (A)    Eosinophils % 08/19/2022 3.8 (A)    Basophils % 08/19/2022 0.8     Segs Absolute 08/19/2022 4.3     Lymphocytes Absolute 08/19/2022 2.3     Monocytes Absolute 08/19/2022 0.6     Eosinophils Absolute 08/19/2022 0.3     Basophils Absolute 08/19/2022 0.1     Nucleated RBC % 08/19/2022 0.0     Total Nucleated RBC 08/19/2022 0.0     Total Immature Neutrophil 08/19/2022 0.02     Immature Neutrophil % 08/19/2022 0.3     Sodium 08/19/2022 142     Potassium 08/19/2022 4.3     Chloride 08/19/2022 104     CO2 08/19/2022 24     BUN 08/19/2022 8     Creatinine 08/19/2022 0.7 (A)    Glucose 08/19/2022 84     Calcium 08/19/2022 9.9     Albumin 08/19/2022 5.2 (A)    Total Protein 08/19/2022 7.6     Total Bilirubin 08/19/2022 0.8     ALT 08/19/2022 18     AST 08/19/2022 21     Alkaline Phosphatase 08/19/2022 90     GFR Non- 08/19/2022 >60     GFR  08/19/2022 >60     Anion Gap 08/19/2022 14     TSH, High Sensitivity 08/19/2022 3.810     Triglycerides 08/19/2022 79     Cholesterol 08/19/2022 142     HDL 08/19/2022 46     LDL Calculated 08/19/2022 80     Vit D, 25-Hydroxy 08/19/2022 11.10 (A)    Hemoglobin A1C 08/19/2022 5.1     eAG 08/19/2022 100     Glucose, Fasting 08/19/2022 85    Admission on 08/17/2022, Discharged on 08/17/2022   Component Date Value    WBC 08/17/2022 8.3     RBC 08/17/2022 4.61     Hemoglobin 08/17/2022 14.7     Hematocrit 08/17/2022 45.1     MCV 08/17/2022 97.8     MCH 08/17/2022 31.9 (A)    MCHC 08/17/2022 32.6     RDW 08/17/2022 12.3     Platelets 90/00/3586 273     MPV 08/17/2022 11.0     Differential Type 08/17/2022 AUTOMATED DIFFERENTIAL     Segs Relative 08/17/2022 57.2     Lymphocytes % 08/17/2022 30.8     Monocytes % 08/17/2022 8.2 (A)    Eosinophils % 08/17/2022 2.5     Basophils % 08/17/2022 1.1 (A)    Segs Absolute 08/17/2022 4.8     Lymphocytes Absolute 08/17/2022 2.6     Monocytes Absolute 08/17/2022 0.7     Eosinophils Absolute 08/17/2022 0.2     Basophils Absolute 08/17/2022 0.1     Nucleated RBC % 08/17/2022 0.0     Total Nucleated RBC 08/17/2022 0.0     Total Immature Neutrophil 08/17/2022 0.02     Immature Neutrophil % 08/17/2022 0.2     Sodium 08/17/2022 134 (A)    Potassium 08/17/2022 3.5     Chloride 08/17/2022 100     CO2 08/17/2022 24     BUN 08/17/2022 9     Creatinine 08/17/2022 0.8 (A)    Glucose 08/17/2022 91     Calcium 08/17/2022 9.7     Albumin 08/17/2022 5.3 (A)    Total Protein 08/17/2022 8.3 (A)    Total Bilirubin 08/17/2022 0.5     ALT 08/17/2022 19     AST 08/17/2022 21     Alkaline Phosphatase 08/17/2022 91     GFR Non- 08/17/2022 >60     GFR  08/17/2022 >60     Anion Gap 08/17/2022 10     Color, UA 08/17/2022 YELLOW     Clarity, UA 08/17/2022 CLEAR     Glucose, Urine 08/17/2022 NEGATIVE     Bilirubin Urine 08/17/2022 NEGATIVE     Ketones, Urine 08/17/2022 TRACE (A)    Specific Gravity, UA 08/17/2022 1.010     Blood, Urine 08/17/2022 NEGATIVE     pH, Urine 08/17/2022 6.0     Protein, UA 08/17/2022 TRACE (A)    Urobilinogen, Urine 08/17/2022 0.2     Nitrite Urine, Quantitat* 08/17/2022 NEGATIVE     Leukocyte Esterase, Urine 08/17/2022 NEGATIVE     RBC, UA 08/17/2022 1     WBC, UA 08/17/2022 1     Bacteria, UA 08/17/2022 NEGATIVE     Mucus, UA 08/17/2022 RARE (A)    Trichomonas, UA 08/17/2022 NONE SEEN     Magnesium 08/17/2022 2.2        EXAMINATION:   CT OF THE ABDOMEN AND PELVIS WITH CONTRAST 8/17/2022 10:02 pm       TECHNIQUE:   CT of the abdomen and pelvis was performed with the administration of   intravenous contrast. Multiplanar reformatted images are provided for review. Automated exposure control, iterative reconstruction, and/or weight based   adjustment of the mA/kV was utilized to reduce the radiation dose to as low   as reasonably achievable. COMPARISON:   None. HISTORY:   ORDERING SYSTEM PROVIDED HISTORY: pain   TECHNOLOGIST PROVIDED HISTORY:   Reason for exam:->pain   Additional Contrast?->None   Decision Support Exception - unselect if not a suspected or confirmed   emergency medical condition->Emergency Medical Condition (MA)   Reason for Exam: ABDOMINALPAIN       FINDINGS:   Lower Chest: Clear lung bases. Organs: Liver, gallbladder, spleen, pancreas, and adrenal glands demonstrate   no acute abnormality. Bilateral kidneys are symmetric in size, contour, and   enhancement. No solid renal mass. GI/Bowel: Stomach, small bowel, and colon are normal in course and caliber   without evidence of wall thickening or obstruction. Pelvis: Normal bladder. Prostate and seminal vesicles are unremarkable. Peritoneum/Retroperitoneum: No free fluid or free air. No pathologic   lymphadenopathy. Aorta and its branches are patent and normal in course and   caliber. Bones/Soft Tissues: No acute or aggressive osseous lesion. Impression   1. No acute intra-abdominal abnormality. Assessment:       Diagnosis Orders   1. Epigastric pain  H. Pylori Breath Test, Adult    Amb External Referral To Gastroenterology    Lipase      2. Vitamin D deficiency  vitamin D3 (CHOLECALCIFEROL) 25 MCG (1000 UT) TABS tablet      3. Vegan        4. Underweight  Amb External Referral To Gastroenterology    HIV Screen      5. Weight loss  Amb External Referral To Gastroenterology    HIV Screen      6. History of 2019 novel coronavirus disease (COVID-19)        7. Need for hepatitis C screening test  Hepatitis C Antibody      8. Palpitations                Plan:      Concerned about weight loss. I have asked him to take his PPI as prescribed. He can also start taking a multivitamin. Will check for HIV and lipase. Check for H. pylori breath test.  I see that patient is going to be seeing a nurse practitioner in gastroenterology. I would like for him to get scoped. Therefore we will send him to gastroenterologist who can scope him quickly. I would like for him to work on gaining 1 pound per week. He can do this by consuming extra 500 deandre/day. It is best that he not eat junk food. Therefore he needs to double or triple the amount of any particular food that he likes. (I will consider putting him on appetite stimulant next time)    Recheck in 1 month. I am not familiar with hyperbaric treatment for his concerns. Return in about 5 weeks (around 9/29/2022) for abd pain. Tammy Trammell

## 2022-08-23 ENCOUNTER — HOSPITAL ENCOUNTER (OUTPATIENT)
Age: 32
Discharge: HOME | End: 2022-08-23

## 2022-08-23 ENCOUNTER — TELEPHONE (OUTPATIENT)
Dept: FAMILY MEDICINE CLINIC | Age: 32
End: 2022-08-23

## 2022-08-23 LAB
HIV AG/AB: NORMAL
HIV ANTIGEN: NORMAL
HIV-1 ANTIBODY: NORMAL
HIV-2 AB: NORMAL

## 2022-08-23 NOTE — TELEPHONE ENCOUNTER
Patient notified and will get test done tomorrow. He also said he is sending back the [employer] form in a e-mail provided a question on it said was patient fasting and he was but was marked no.

## 2022-08-23 NOTE — TELEPHONE ENCOUNTER
As mentioned yesterday, the breath test is the more appropriate test for H. Pylori. I don't plan on ordering any stool tests at this time. His GI doctor may order that if warranted.

## 2022-08-23 NOTE — TELEPHONE ENCOUNTER
Patient asking can he do a stool test for the H pylori instead of the breath test. Patient asking if there are other stool test you can order to see what is going on.

## 2022-08-23 NOTE — TELEPHONE ENCOUNTER
Patient saw gastro today  they are scheduling a scope and biopsy for next week. Patient is having the H-pylori breath test tomorrow. In the instructions he is to be off Protonix for 2 week. Patient did have Protonix in an IV yesterday. Is it still okay to have the test done tomorrow. Patient asked if he is to have test done if he is having a scope and biopsy next week. He was told they would also test for H Pylori He was told by Maricel Desai to do the tests Dr Cara Brooks ordered. Patient is confused why to do both?

## 2022-09-02 ENCOUNTER — HOSPITAL ENCOUNTER (OUTPATIENT)
Age: 32
Setting detail: SPECIMEN
Discharge: HOME | End: 2022-09-02
Payer: COMMERCIAL

## 2022-09-02 LAB
HEMOCCULT SP1 STL QL: NEGATIVE
REASON FOR REJECTION: NORMAL
REJECTED TEST: NORMAL

## 2022-09-02 PROCEDURE — 87209 SMEAR COMPLEX STAIN: CPT

## 2022-09-02 PROCEDURE — 87177 OVA AND PARASITES SMEARS: CPT

## 2022-09-02 PROCEDURE — 82270 OCCULT BLOOD FECES: CPT

## 2022-09-09 LAB — INTERPRETATION: NEGATIVE

## 2022-09-15 ENCOUNTER — TELEPHONE (OUTPATIENT)
Dept: FAMILY MEDICINE CLINIC | Age: 32
End: 2022-09-15

## 2022-09-15 NOTE — TELEPHONE ENCOUNTER
I do not know if even needs this medication. I believe he has seen Dr. Addie Vasquez and is getting worked up for his symptoms. Does he know if Dr. Addie Vasquez wanted him to continue taking the Prilosec?

## 2022-09-15 NOTE — TELEPHONE ENCOUNTER
Patient called stating while in the ED on 8/17/22 they prescribed oral omeprazole that is not covered by his insurance. Patient is asking what he can take as he cannot swallow pills.

## 2022-09-16 NOTE — TELEPHONE ENCOUNTER
Called patient  and will call Dr Magdalena aCsiano about taking Omeprazole patient understood all questions answered.

## 2022-09-21 NOTE — PROGRESS NOTES
.Surgery @ [facility] on 9/29/22 you will be called 9/28/22 with times               1. Do not eat or drink anything after midnight - unless instructed by your doctor prior to surgery. This includes                   no water, chewing gum or mints. 2. Follow your directions as prescribed by the doctor for your procedure and medications. Take Omeprazole morning of surgery with a sip of water. 3. Check with your Doctor regarding stopping vitamins, supplements, blood thinners (Plavix, Coumadin, Lovenox, Effient, Pradaxa, Xarelto, Fragmin or                   other blood thinners) and follow their instructions. Stop vitamins, supplements and NSAIDS: 9/22/22   4. Do not smoke, vape or use chewing tobacco morning of surgery. Do not drink any alcoholic beverages 24 hours prior to surgery. This includes NA Beer. No street drugs 7 days prior to surgery. 5. You may brush your teeth and gargle the morning of surgery. DO NOT SWALLOW WATER   6. You MUST make arrangements for a responsible adult to take you home after your surgery and be able to check on you every couple                   hours for the day. You will not be allowed to leave alone or drive yourself home. It is strongly suggested someone stay with you the first 24                   hrs. Your surgery will be cancelled if you do not have a ride home. 7. Please wear simple, loose fitting clothing to the hospital.  Katie Cokeritri not bring valuables (money, credit cards, checkbooks, etc.) Do not wear any                   makeup (including no eye makeup) or nail polish on your fingers or toes. 8. DO NOT wear any jewelry or piercings on day of surgery. All body piercing jewelry must be removed. 9. If you have dentures, they will be removed before going to the OR; we will provide you a container. If you wear contact lenses or glasses,                  they will be removed; please bring a case for them.            10. If you  have a Living Will and Durable Power of  for Healthcare, please bring in a copy. 11. Please bring picture ID,  insurance card, paperwork from the doctors office    (H & P, Consent, & card for implantable devices). 12. Take a shower the morning of your procedure. Do not apply any make-up, deodorant, lotion, oil or powder. 13.  Enter thru the main entrance wearing a mask on the day of surgery.

## 2022-09-22 ENCOUNTER — HOSPITAL ENCOUNTER (OUTPATIENT)
Age: 32
Discharge: HOME | End: 2022-09-22
Payer: COMMERCIAL

## 2022-09-22 ENCOUNTER — TELEPHONE (OUTPATIENT)
Dept: FAMILY MEDICINE CLINIC | Age: 32
End: 2022-09-22

## 2022-09-22 PROCEDURE — 83013 H PYLORI (C-13) BREATH: CPT

## 2022-09-22 NOTE — TELEPHONE ENCOUNTER
Patient called states he has to reschedule his endoscopy procedure because he doesn't want to have a episode when its scheduled at 12 noon. He needs to reschedule to have test done in the early morning. He is waiting for the hospital to call him back for a time. Patient wants to know if Dr Tom rangel could place a order for H.Pylori  prep test through a blood test or stool test instead of the breath test he has read online that taking  Omeprazole can give a false negative reading on the breath test H. Pylori . Patient also states when he doesn't eat on time he ends up having a episode  like abdominal cramps, feeling nauseas and gassy.

## 2022-09-23 LAB — H PYLORI BREATH TEST: POSITIVE

## 2022-09-26 ENCOUNTER — TELEPHONE (OUTPATIENT)
Dept: FAMILY MEDICINE CLINIC | Age: 32
End: 2022-09-26

## 2022-09-26 DIAGNOSIS — R76.8 HELICOBACTER PYLORI ANTIBODY POSITIVE: ICD-10-CM

## 2022-09-26 DIAGNOSIS — R76.8 HELICOBACTER PYLORI ANTIBODY POSITIVE: Primary | ICD-10-CM

## 2022-09-26 RX ORDER — LANSOPRAZOLE, AMOXICILLIN, CLARITHROMYCIN 30-500-500
KIT ORAL
Qty: 28 EACH | Refills: 0 | Status: SHIPPED | OUTPATIENT
Start: 2022-09-26

## 2022-09-26 RX ORDER — LANSOPRAZOLE, AMOXICILLIN, CLARITHROMYCIN 30-500-500
KIT ORAL
Qty: 14 EACH | Refills: 0 | Status: SHIPPED | OUTPATIENT
Start: 2022-09-26 | End: 2022-09-26 | Stop reason: SDUPTHER

## 2022-09-26 RX ORDER — CLARITHROMYCIN 250 MG/5ML
500 FOR SUSPENSION ORAL 2 TIMES DAILY
Qty: 280 ML | Refills: 0 | Status: SHIPPED | OUTPATIENT
Start: 2022-09-26 | End: 2022-10-10

## 2022-09-26 RX ORDER — LANSOPRAZOLE 30 MG/1
30 TABLET, ORALLY DISINTEGRATING, DELAYED RELEASE ORAL 2 TIMES DAILY
Qty: 28 TABLET | Refills: 0 | Status: SHIPPED | OUTPATIENT
Start: 2022-09-26 | End: 2022-10-10

## 2022-09-26 NOTE — TELEPHONE ENCOUNTER
Pharmacy called stating they need clarification is the patient suppose to take the amoxicillin for 7 days with a quantity 14 each. Do you want the patient to take the medication for 7 or 14 days.  Please clarify

## 2022-09-26 NOTE — RESULT ENCOUNTER NOTE
In order to change to liquid form all 3 medications Needs to be sent separately in a liquid form. The Pharmacy will have to put in a request order for Clarithromycin and Lansoprazole  For the Lansoprazole they do have a disintegrating tablet form.

## 2022-09-27 ENCOUNTER — TELEPHONE (OUTPATIENT)
Dept: FAMILY MEDICINE CLINIC | Age: 32
End: 2022-09-27

## 2022-09-27 ENCOUNTER — ANESTHESIA EVENT (OUTPATIENT)
Dept: ENDOSCOPY | Age: 32
End: 2022-09-27
Payer: COMMERCIAL

## 2022-09-27 NOTE — ANESTHESIA PRE PROCEDURE
Department of Anesthesiology  Preprocedure Note       Name:  Alyssa Martinez   Age:  27 y.o.  :  1990                                          MRN:  [id number]         Date:  2022      Surgeon: Fatimah Valdovinos):  Allen Mares MD    Procedure: Procedure(s):  EGD ESOPHAGOGASTRODUODENOSCOPY    Medications prior to admission:   Prior to Admission medications    Medication Sig Start Date End Date Taking? Authorizing Provider   amoxicillin-clarithromycin-lansoprazole [facility]) combo pack 1 dose twice per day for 14 days 22   Dave Gibson MD   lansoprazole (PREVACID SOLUTAB) 30 MG disintegrating tablet Take 1 tablet by mouth 2 times daily for 14 days 9/26/22 10/10/22  Dave Gibson MD   amoxicillin (AMOXIL) 250 MG chewable tablet Take 4 tablets by mouth in the morning and at bedtime for 14 days 9/26/22 10/10/22  Dave Gibson MD   clarithromycin Lonita Postin) 250 MG/5ML suspension Take 10 mLs by mouth 2 times daily for 14 days 9/26/22 10/10/22  Dave Gibson MD   vitamin D3 (CHOLECALCIFEROL) 25 MCG (1000 UT) TABS tablet Take 1 tablet by mouth daily 22   Dave Gibson MD   omeprazole (PRILOSEC) 2 MG/ML SUSP 2 mg/mL oral suspension Take 10 mLs by mouth Daily  Patient not taking: Reported on 2022   Saroj Starkey PA-C       Current medications:    No current facility-administered medications for this encounter.      Current Outpatient Medications   Medication Sig Dispense Refill    amoxicillin-clarithromycin-lansoprazole (PREVPAC) combo pack 1 dose twice per day for 14 days 28 each 0    lansoprazole (PREVACID SOLUTAB) 30 MG disintegrating tablet Take 1 tablet by mouth 2 times daily for 14 days 28 tablet 0    amoxicillin (AMOXIL) 250 MG chewable tablet Take 4 tablets by mouth in the morning and at bedtime for 14 days 112 tablet 0    clarithromycin (BIAXIN) 250 MG/5ML suspension Take 10 mLs by mouth 2 times daily for 14 days 280 mL 0    vitamin D3 (CHOLECALCIFEROL) 25 MCG (1000 UT) TABS tablet Take 1 tablet by mouth daily 90 tablet 3    omeprazole (PRILOSEC) 2 MG/ML SUSP 2 mg/mL oral suspension Take 10 mLs by mouth Daily (Patient not taking: Reported on 9/21/2022) 6000 mL 0       Allergies: Allergies   Allergen Reactions    Gentamicin      Unknown reaction, reaction as a child       Problem List:    Patient Active Problem List   Diagnosis Code    Vegan Z78.9    Underweight R63.6    History of 2019 novel coronavirus disease (COVID-19) Z86.16       Past Medical History:  History reviewed. No pertinent past medical history. Past Surgical History:  History reviewed. No pertinent surgical history. Social History:    Social History     Tobacco Use    Smoking status: Never    Smokeless tobacco: Never   Substance Use Topics    Alcohol use: Never                                Counseling given: Not Answered      Vital Signs (Current):   Vitals:    09/21/22 1419   Weight: 109 lb (49.4 kg)   Height: 5' 8\" (1.727 m)                                              BP Readings from Last 3 Encounters:   08/22/22 110/80   08/17/22 114/75   08/12/22 (!) 102/58       NPO Status:                                                                                 BMI:   Wt Readings from Last 3 Encounters:   08/22/22 109 lb (49.4 kg)   08/17/22 108 lb (49 kg)   08/12/22 109 lb 12.8 oz (49.8 kg)     Body mass index is 16.57 kg/m².     CBC:   Lab Results   Component Value Date/Time    WBC 7.5 08/19/2022 07:54 AM    RBC 4.66 08/19/2022 07:54 AM    HGB 14.7 08/19/2022 07:54 AM    HCT 46.9 08/19/2022 07:54 AM    .6 08/19/2022 07:54 AM    RDW 12.3 08/19/2022 07:54 AM     08/19/2022 07:54 AM       CMP:   Lab Results   Component Value Date/Time     08/19/2022 07:54 AM    K 4.3 08/19/2022 07:54 AM     08/19/2022 07:54 AM    CO2 24 08/19/2022 07:54 AM    BUN 8 08/19/2022 07:54 AM    CREATININE 0.7 08/19/2022 07:54 AM    GFRAA >60 08/19/2022 07:54 AM    LABGLOM >60 08/19/2022 07:54 AM    GLUCOSE 84 08/19/2022 07:54 AM    PROT 7.6 08/19/2022 07:54 AM    CALCIUM 9.9 08/19/2022 07:54 AM    BILITOT 0.8 08/19/2022 07:54 AM    ALKPHOS 90 08/19/2022 07:54 AM    AST 21 08/19/2022 07:54 AM    ALT 18 08/19/2022 07:54 AM       POC Tests: No results for input(s): POCGLU, POCNA, POCK, POCCL, POCBUN, POCHEMO, POCHCT in the last 72 hours. Coags: No results found for: PROTIME, INR, APTT    HCG (If Applicable): No results found for: PREGTESTUR, PREGSERUM, HCG, HCGQUANT     ABGs: No results found for: PHART, PO2ART, XTY8FMA, BCP5HTA, BEART, Z8AVXSVR     Type & Screen (If Applicable):  No results found for: LABABO, LABRH    Drug/Infectious Status (If Applicable):  No results found for: HIV, HEPCAB    COVID-19 Screening (If Applicable): No results found for: COVID19        Anesthesia Evaluation    Airway: Mallampati: I  TM distance: >3 FB   Neck ROM: full  Mouth opening: > = 3 FB   Dental: normal exam         Pulmonary:Negative Pulmonary ROS and normal exam                               Cardiovascular:Negative CV ROS                      Neuro/Psych:   Negative Neuro/Psych ROS              GI/Hepatic/Renal:   (+) GERD:,           Endo/Other: Negative Endo/Other ROS                    Abdominal:             Vascular: negative vascular ROS. Other Findings:           Anesthesia Plan      MAC     ASA 2       Induction: intravenous. Anesthetic plan and risks discussed with patient. Plan discussed with CRNA.     Attending anesthesiologist reviewed and agrees with Preprocedure content                HARMAN Palacios - CRNA   9/27/2022

## 2022-09-27 NOTE — TELEPHONE ENCOUNTER
Any questions about another patient should be directed to that patient's chart. .  That patient needs to make an appointment

## 2022-09-28 NOTE — PROGRESS NOTES
Spoke with patient and he will arrive at [phone] at [facility] on 9/29/2022 for his procedure at 1215. IV order in [facility], placed by Yasir Morrell.

## 2022-09-29 ENCOUNTER — HOSPITAL ENCOUNTER (OUTPATIENT)
Age: 32
Setting detail: OUTPATIENT SURGERY
Discharge: HOME | End: 2022-09-29
Attending: SPECIALIST | Admitting: SPECIALIST
Payer: COMMERCIAL

## 2022-09-29 ENCOUNTER — TELEPHONE (OUTPATIENT)
Dept: FAMILY MEDICINE CLINIC | Age: 32
End: 2022-09-29

## 2022-09-29 ENCOUNTER — ANESTHESIA (OUTPATIENT)
Dept: ENDOSCOPY | Age: 32
End: 2022-09-29
Payer: COMMERCIAL

## 2022-09-29 VITALS
TEMPERATURE: 97 F | WEIGHT: 110 LBS | OXYGEN SATURATION: 100 % | BODY MASS INDEX: 16.67 KG/M2 | DIASTOLIC BLOOD PRESSURE: 86 MMHG | SYSTOLIC BLOOD PRESSURE: 109 MMHG | RESPIRATION RATE: 16 BRPM | HEIGHT: 68 IN | HEART RATE: 66 BPM

## 2022-09-29 DIAGNOSIS — R10.10 UPPER ABDOMINAL PAIN: ICD-10-CM

## 2022-09-29 PROCEDURE — 88305 TISSUE EXAM BY PATHOLOGIST: CPT | Performed by: PATHOLOGY

## 2022-09-29 PROCEDURE — 3700000001 HC ADD 15 MINUTES (ANESTHESIA): Performed by: SPECIALIST

## 2022-09-29 PROCEDURE — 6360000002 HC RX W HCPCS

## 2022-09-29 PROCEDURE — 2709999900 HC NON-CHARGEABLE SUPPLY: Performed by: SPECIALIST

## 2022-09-29 PROCEDURE — 7100000010 HC PHASE II RECOVERY - FIRST 15 MIN: Performed by: SPECIALIST

## 2022-09-29 PROCEDURE — 2580000003 HC RX 258: Performed by: ANESTHESIOLOGY

## 2022-09-29 PROCEDURE — 3609015800 HC ESOPHAGOSCOPY BIOPSY: Performed by: SPECIALIST

## 2022-09-29 PROCEDURE — 3700000000 HC ANESTHESIA ATTENDED CARE: Performed by: SPECIALIST

## 2022-09-29 PROCEDURE — 7100000011 HC PHASE II RECOVERY - ADDTL 15 MIN: Performed by: SPECIALIST

## 2022-09-29 RX ORDER — SODIUM CHLORIDE, SODIUM LACTATE, POTASSIUM CHLORIDE, CALCIUM CHLORIDE 600; 310; 30; 20 MG/100ML; MG/100ML; MG/100ML; MG/100ML
INJECTION, SOLUTION INTRAVENOUS CONTINUOUS
Status: DISCONTINUED | OUTPATIENT
Start: 2022-09-29 | End: 2022-09-29 | Stop reason: HOSPADM

## 2022-09-29 RX ORDER — LIDOCAINE HYDROCHLORIDE 20 MG/ML
INJECTION, SOLUTION INTRAVENOUS PRN
Status: DISCONTINUED | OUTPATIENT
Start: 2022-09-29 | End: 2022-09-29 | Stop reason: SDUPTHER

## 2022-09-29 RX ORDER — PROPOFOL 10 MG/ML
INJECTION, EMULSION INTRAVENOUS PRN
Status: DISCONTINUED | OUTPATIENT
Start: 2022-09-29 | End: 2022-09-29 | Stop reason: SDUPTHER

## 2022-09-29 RX ADMIN — PROPOFOL 220 MG: 10 INJECTION, EMULSION INTRAVENOUS at 12:03

## 2022-09-29 RX ADMIN — SODIUM CHLORIDE, POTASSIUM CHLORIDE, SODIUM LACTATE AND CALCIUM CHLORIDE: 600; 310; 30; 20 INJECTION, SOLUTION INTRAVENOUS at 11:02

## 2022-09-29 RX ADMIN — LIDOCAINE HYDROCHLORIDE 100 MG: 20 INJECTION, SOLUTION INTRAVENOUS at 12:03

## 2022-09-29 ASSESSMENT — PAIN DESCRIPTION - DESCRIPTORS: DESCRIPTORS: CRAMPING

## 2022-09-29 ASSESSMENT — PAIN SCALES - GENERAL
PAINLEVEL_OUTOF10: 0
PAINLEVEL_OUTOF10: 0

## 2022-09-29 ASSESSMENT — PAIN - FUNCTIONAL ASSESSMENT: PAIN_FUNCTIONAL_ASSESSMENT: 0-10

## 2022-09-29 NOTE — TELEPHONE ENCOUNTER
Patient called states the pharmacy told him the medication Lansoprazole needs a prior auth. After looking into this looks as its being look at and will deandre when medication gets approved or not it being worked on.

## 2022-09-29 NOTE — PROGRESS NOTES
1335:  Discharge instructions discussed with patient and spouse, both verbalized an understanding. 1340:  Pt discharged to home alert and oriented with no c/o pain or increased difficulty swallowing. Pt tolerating PO, VSS. Pt discharged via wheelchair.

## 2022-09-29 NOTE — ANESTHESIA POSTPROCEDURE EVALUATION
Department of Anesthesiology  Postprocedure Note    Patient: Rufino Negro  MRN: [id number]  YOB: 1990  Date of evaluation: 9/29/2022      Procedure Summary     Date: 09/29/22 Room / Location: [address] [place] [address]    Anesthesia Start: 1157 Anesthesia Stop: 2100    Procedure: ESOPHAGOSCOPY BIOPSY Diagnosis:       Upper abdominal pain      (Upper abdominal pain [R10.10])    Surgeons: Carla Brown MD Responsible Provider: Mirella Flores DO    Anesthesia Type: MAC ASA Status: 2          Anesthesia Type: No value filed.     Kaykay Phase I:      Kaykay Phase II:        Anesthesia Post Evaluation    Patient location during evaluation: bedside  Patient participation: complete - patient participated  Level of consciousness: awake and alert  Pain score: 0  Airway patency: patent  Nausea & Vomiting: no nausea and no vomiting  Complications: no  Cardiovascular status: blood pressure returned to baseline and hemodynamically stable  Respiratory status: acceptable, room air and spontaneous ventilation  Hydration status: euvolemic

## 2022-09-29 NOTE — PROGRESS NOTES
1234 pt denies pain or nausea. Slightly drowsy, call light in reach.  Report from Aishwarya rn at bedside  1244 pt sipping water that they brought from home

## 2022-09-29 NOTE — OP NOTE
[address] [place] [address] [facility]                                OPERATIVE REPORT    PATIENT NAME: Imani Duron                  :        1990  MED REC NO:   [id number]                          ROOM:  ACCOUNT NO:   [de-identified]                           ADMIT DATE: 2022  PROVIDER:     Philip Roa MD    DATE OF PROCEDURE:  2022    PRIMARY CARE PROVIDER:  Judge Gaby MD    OPERATION PERFORMED:  Flexible esophagoscopy with biopsy. SURGEON:  Philip Roa MD    CHIEF COMPLAINT:  Gastroesophageal reflux disease/dysphagia. PREMEDICATION:  Please refer to the anesthesiologist's notes. OPERATIVE PROCEDURE:  The patient was placed in the left lateral  decubitus position and the video Olympus gastroscope was introduced in  back of throat and was advanced into the esophagus. ESOPHAGUS:  Upper esophageal sphincter was around 15 cm and fairly tight  ring-like stricture was noted at 20 cm with the result that the  gastroscope could not be advanced distally and endoscopic picture was  consistent with eosinophilic esophagitis. There was no evidence of  exudate or linear furrows or mass lesions. Multiple biopsies were  obtained from the region of the stricture and sent for histopathology to  rule out eosinophilic esophagitis. The procedure was terminated at this  point. POSTOPERATIVE DIAGNOSIS:  There is a ring-like strictures noted at 20  cm; biopsies obtained; rule out eosinophilic esophagitis. RECOMMENDATIONS:  1. Antireflux measures. 2.  We will start the patient on Protonix suspension 40 mg daily in the  morning. 3.  We will follow up on the above path report and if consistent with  eosinophilic esophagitis, we will start the patient on  Flovent/budesonide also.   4.  We would like to do a followup EGD in about six weeks' time to  reexamine the esophagus and also to perform esophageal dilatation as  well. 5.  We will also schedule the patient for a timed upright esophagogram  to evaluate the distal esophagus. 6.  The patient has been instructed to stay on a soft diet and eat some  nutritional supplements as well because of history of weight loss. The patient tolerated the procedure well. There were no postop  complications.   Blood loss during the procedure was minimal.        Jose Astorga MD    D: 09/29/2022 12:19:11       T: 09/29/2022 12:22:30     [email or web address]  Job#: [id number]     Doc#: [id number]    CC:  Deneen Crowell MD

## 2022-09-29 NOTE — BRIEF OP NOTE
Brief Postoperative Note      Omega Tsai is a 27 y.o. male     Pre-operative Diagnosis: DYSPHAGIA    Post-operative Diagnosis: RING LIKE STRICTURE AT 20 CM -BX - R/O  EoE    Procedure: FLEXIBLE ESOPHAGOSCOPY WITH BX    Anesthesia: MAC    Surgeons/Assistants:Dimple Zambrano MD     Estimated Blood Loss: MINIMAL    Complications: None    Specimens: WERE obtained  REC -PPI / ESOPHAGOGRAM / F/U BX  / REPEAT EGD IN 6 WKS WITH DILATION    Joe Buckley MD   9/29/2022   12:13 PM

## 2022-09-29 NOTE — DISCHARGE INSTRUCTIONS
EGD    DR. ORTIZ    OFFICE NUMBER [phone]    FOLLOW UP APPOINTMENT IN 1 WEEK      REPEAT PROCEDURE  TO BE DETERMINED. TEST ORDERED: ESOPHAGRAM TO BE SCHEDULED BY DR POWERS OFFICE. SOFT DIET    What to Expect at Home  Your Recovery:  The only discomfort after your EGD is generally limited to a mild soreness of the throat, which may last a day or two. Call your physician immediately if you have severe chest pain, shortness of breath or a temperature of 100 degrees or higher if taken orally. How can you care for yourself at home? Activity  Rest as much as you need to after you go home. You should be able to go back to your usual activities the day after the test.  Diet  Follow your doctor's directions for eating after the test.  Drink plenty of fluids (unless your doctor has told you not to). Medications  If you have a sore throat the day after the test, use an over-the-counter spray to numb your throat. Your doctor will tell you if and when you can restart your medicines. He or she will also give you instructions about taking any new medicines. If you take blood thinners, such as warfarin (Coumadin), clopidogrel (Plavix), or aspirin, be sure to talk to your doctor. He or she will tell you if and when to start taking those medicines again. Make sure that you understand exactly what your doctor wants you to do. If a biopsy was done during the test, your doctor may tell you not to take aspirin or other anti-inflammatory medicines for a few days. These include ibuprofen (Advil, Motrin) and naproxen (Aleve). DO NOT DRINK ANYTHING WITH ALCOHOL TODAY. Other instructions:Anesthesia  For your safety, do not drive or operate machinery for 24 hours. Do not sign legal documents or make major decisions for 24 hours. The anesthesia can make it hard for you to fully understand what you are agreeing to. Follow-up care is a key part of your treatment and safety.  Be sure to make and go to all appointments, and call your doctor if you are having problems. It's also a good idea to know your test results and keep a list of the medicines you take. When should you call for help? [address] [facility] Felicia Aburto Chalo Eli Shamar Prince [phone]  Call 911 anytime you think you may need emergency care. For example, call if:  You passed out (lost consciousness). You cough up blood. You vomit blood or what looks like coffee grounds. You pass maroon or very bloody stools. Call your doctor now or seek immediate medical care if:  You have trouble swallowing. You have belly pain. Your stools are black and tarlike or have streaks of blood. You are sick to your stomach or cannot keep fluids down. Watch closely for changes in your health, and be sure to contact your doctor if:  Your throat still hurts after a day or two. You do not get better as expected. Where can you learn more? Go to [email or web address] [email or web address] org and sign in to your SnapNames account. Enter M338 in the Hover 3D box to learn more about Upper GI Endoscopy: What to Expect at Home.     If you do not have an account, please click on the Sign Up Now link. © 2256-2733 Healthwise, Incorporated. Care instructions adapted under license by [facility] ([facility]). This care instruction is for use with your licensed healthcare professional. If you have questions about a medical condition or this instruction, always ask your healthcare professional. [place] [address] any warranty or liability for your use of this information. Content Version: 47.7.529843; Current as of: November 20, 2015       Advance Care Planning  People with COVID-19 may have no symptoms, mild symptoms, such as fever, cough, and shortness of breath or they may have more severe illness, developing severe and fatal pneumonia.   As a result, Advance Care Planning with attention to naming a health care decision maker (someone you trust to make healthcare decisions for you if you could not speak for yourself) and sharing other health care preferences is important BEFORE a possible health crisis. Please contact your Primary Care Provider to discuss Advance Care Planning. Preventing the Spread of Coronavirus Disease 2019 in Homes and Residential Communities  For the most recent information go to [employer].fi    Prevention steps for People with confirmed or suspected COVID-19 (including persons under investigation) who do not need to be hospitalized  and   People with confirmed COVID-19 who were hospitalized and determined to be medically stable to go home    Your healthcare provider and public health staff will evaluate whether you can be cared for at home. If it is determined that you do not need to be hospitalized and can be isolated at home, you will be monitored by staff from your local or state health department. You should follow the prevention steps below until a healthcare provider or local or state health department says you can return to your normal activities. Stay home except to get medical care  People who are mildly ill with COVID-19 are able to isolate at home during their illness. You should restrict activities outside your home, except for getting medical care. Do not go to work, school, or public areas. Avoid using public transportation, ride-sharing, or taxis. Separate yourself from other people and animals in your home  People: As much as possible, you should stay in a specific room and away from other people in your home. Also, you should use a separate bathroom, if available. Animals: You should restrict contact with pets and other animals while you are sick with COVID-19, just like you would around other people.  Although there have not been reports of pets or other animals becoming sick with COVID-19, it is still recommended that people sick with COVID-19 limit contact with animals until more information is known about the virus. When possible, have another member of your household care for your animals while you are sick. If you are sick with COVID-19, avoid contact with your pet, including petting, snuggling, being kissed or licked, and sharing food. If you must care for your pet or be around animals while you are sick, wash your hands before and after you interact with pets and wear a facemask. Call ahead before visiting your doctor  If you have a medical appointment, call the healthcare provider and tell them that you have or may have COVID-19. This will help the healthcare providers office take steps to keep other people from getting infected or exposed. Wear a facemask  You should wear a facemask when you are around other people (e.g., sharing a room or vehicle) or pets and before you enter a healthcare providers office. If you are not able to wear a facemask (for example, because it causes trouble breathing), then people who live with you should not stay in the same room with you, or they should wear a facemask if they enter your room. Cover your coughs and sneezes  Cover your mouth and nose with a tissue when you cough or sneeze. Throw used tissues in a lined trash can. Immediately wash your hands with soap and water for at least 20 seconds or, if soap and water are not available, clean your hands with an alcohol-based hand  that contains at least 60% alcohol. Clean your hands often  Wash your hands often with soap and water for at least 20 seconds, especially after blowing your nose, coughing, or sneezing; going to the bathroom; and before eating or preparing food. If soap and water are not readily available, use an alcohol-based hand  with at least 60% alcohol, covering all surfaces of your hands and rubbing them together until they feel dry. Soap and water are the best option if hands are visibly dirty.  Avoid touching your eyes, nose, and mouth with unwashed hands. Avoid sharing personal household items  You should not share dishes, drinking glasses, cups, eating utensils, towels, or bedding with other people or pets in your home. After using these items, they should be washed thoroughly with soap and water. Clean all high-touch surfaces everyday  High touch surfaces include counters, tabletops, doorknobs, bathroom fixtures, toilets, phones, keyboards, tablets, and bedside tables. Also, clean any surfaces that may have blood, stool, or body fluids on them. Use a household cleaning spray or wipe, according to the label instructions. Labels contain instructions for safe and effective use of the cleaning product including precautions you should take when applying the product, such as wearing gloves and making sure you have good ventilation during use of the product. Monitor your symptoms  Seek prompt medical attention if your illness is worsening (e.g., difficulty breathing). Before seeking care, call your healthcare provider and tell them that you have, or are being evaluated for, COVID-19. Put on a facemask before you enter the facility. These steps will help the healthcare providers office to keep other people in the office or waiting room from getting infected or exposed. Ask your healthcare provider to call the local or state health department. Persons who are placed under active monitoring or facilitated self-monitoring should follow instructions provided by their local health department or occupational health professionals, as appropriate. When working with your local health department check their available hours. If you have a medical emergency and need to call 911, notify the dispatch personnel that you have, or are being evaluated for COVID-19. If possible, put on a facemask before emergency medical services arrive.   Discontinuing home isolation  Patients with confirmed COVID-19 should remain under home isolation precautions until the risk of secondary transmission to others is thought to be low. The decision to discontinue home isolation precautions should be made on a case-by-case basis, in consultation with healthcare providers and state and local health departments.

## 2022-09-29 NOTE — TELEPHONE ENCOUNTER
Noted    Patient is H. Pylori positive.   Please use this as the dx if we are doing a prior Leanor Paula

## 2022-10-06 ENCOUNTER — TELEPHONE (OUTPATIENT)
Dept: FAMILY MEDICINE CLINIC | Age: 32
End: 2022-10-06

## 2022-10-06 NOTE — TELEPHONE ENCOUNTER
Patient called wanting to know if he can do a virtual visit today instead of coming into the office he is gassy, nauseas having another episode. The Gi dr he saw wants to wait   8 weeks before starting    H. Pylori treatment due to him having inflammation put on a steroid he is going to start today , but doesn't remember the name of it. Patient thinks the H. Pylori is causing his inflammation. Patient is also concerned about waiting 8 weeks before starting treatment. the medication Lansoprazole still insurance not approving patient is going to see how much out of pocket it will coat him.

## 2022-10-21 ENCOUNTER — HOSPITAL ENCOUNTER (OUTPATIENT)
Dept: GENERAL RADIOLOGY | Age: 32
Discharge: HOME | End: 2022-10-21
Payer: COMMERCIAL

## 2022-10-21 DIAGNOSIS — R10.10 UPPER ABDOMINAL PAIN, UNSPECIFIED: ICD-10-CM

## 2022-10-21 DIAGNOSIS — R63.4 WEIGHT LOSS: ICD-10-CM

## 2022-10-21 PROCEDURE — 74240 X-RAY XM UPR GI TRC 1CNTRST: CPT

## 2022-10-21 PROCEDURE — 74220 X-RAY XM ESOPHAGUS 1CNTRST: CPT

## 2022-12-21 NOTE — PROGRESS NOTES
Patient will be called with an arrival time on 12/28/2022 for his procedure at [facility] on 12/29/2022. 1. Do not eat or drink anything after midnight - unless instructed by your doctor prior to surgery. This includes                   no water, chewing gum or mints. 2. Follow your directions as prescribed by the doctor for your procedure and medications. 3. Check with your Doctor regarding stopping vitamins, supplements, blood thinners and follow their instructions. Stop vitamins, supplements and NSAIDS:    4. Do not smoke, vape or use chewing tobacco morning of surgery. Do not drink any alcoholic beverages 24 hours prior to surgery. This includes NA Beer. No street drugs 7 days prior to surgery. 5. You may brush your teeth and gargle the morning of surgery. DO NOT SWALLOW WATER   6. You MUST make arrangements for a responsible adult to take you home after your surgery and be able to check on you every couple                   hours for the day. You will not be allowed to leave alone or drive yourself home. It is strongly suggested someone stay with you the first 24                   hrs. Your surgery will be cancelled if you do not have a ride home. 7. Please wear simple, loose fitting clothing to the hospital.  Roberto Thorntoncher not bring valuables (money, credit cards, checkbooks, etc.) Do not wear any                   makeup (including no eye makeup) or nail polish on your fingers or toes. 8. DO NOT wear any jewelry or piercings on day of surgery. All body piercing jewelry must be removed. 9. If you have dentures, they will be removed before going to the OR; we will provide you a container. If you wear contact lenses or glasses,                  they will be removed; please bring a case for them. 10. If you  have a Living Will and Durable Power of  for Healthcare, please bring in a copy.            11. Please bring picture ID,  insurance card, paperwork from the doctors office    (H & P, Consent, & card for implantable devices). 12. Take a shower the morning of your procedure with Hibiclens or an anti-bacterial soap. Do not apply any make-up, deodorant, lotion, oil or powder. 15.  Enter thru the main entrance on the day of surgery. Patient will call Dr Sonia Adler office with questions concerning egd procedure.

## 2022-12-27 ENCOUNTER — ANESTHESIA EVENT (OUTPATIENT)
Dept: ENDOSCOPY | Age: 32
End: 2022-12-27
Payer: COMMERCIAL

## 2022-12-27 NOTE — ANESTHESIA PRE PROCEDURE
Department of Anesthesiology  Preprocedure Note       Name:  Bibiana Villasenor   Age:  27 y.o.  :  1990                                          MRN:  [id number]         Date:  2022      Surgeon: Landon Vera):  Linnea Barber MD    Procedure: Procedure(s):  EGD DILATION BALLOON    Medications prior to admission:   Prior to Admission medications    Medication Sig Start Date End Date Taking? Authorizing Provider   amoxicillin-clarithromycin-lansoprazole [facility]) combo pack 1 dose twice per day for 14 days  Patient not taking: Reported on 2022   Britton Ulloa MD   lansoprazole (PREVACID SOLUTAB) 30 MG disintegrating tablet Take 1 tablet by mouth 2 times daily for 14 days  Patient not taking: Reported on 2022 9/26/22 10/10/22  Britton Ulloa MD   vitamin D3 (CHOLECALCIFEROL) 25 MCG (1000 UT) TABS tablet Take 1 tablet by mouth daily 22   Britton Ulloa MD   omeprazole (PRILOSEC) 2 MG/ML SUSP 2 mg/mL oral suspension Take 10 mLs by mouth Daily  Patient not taking: Reported on 2022   Nhung Farrell PA-C       Current medications:    No current facility-administered medications for this encounter. Current Outpatient Medications   Medication Sig Dispense Refill    amoxicillin-clarithromycin-lansoprazole (PREVPAC) combo pack 1 dose twice per day for 14 days (Patient not taking: Reported on 2022) 28 each 0    lansoprazole (PREVACID SOLUTAB) 30 MG disintegrating tablet Take 1 tablet by mouth 2 times daily for 14 days (Patient not taking: Reported on 2022) 28 tablet 0    vitamin D3 (CHOLECALCIFEROL) 25 MCG (1000 UT) TABS tablet Take 1 tablet by mouth daily 90 tablet 3    omeprazole (PRILOSEC) 2 MG/ML SUSP 2 mg/mL oral suspension Take 10 mLs by mouth Daily (Patient not taking: Reported on 2022) 6000 mL 0       Allergies:     Allergies   Allergen Reactions    Gentamicin      Unknown reaction, reaction as a child       Problem List:    Patient Active Problem List   Diagnosis Code    Vegan Z78.9    Underweight R63.6    History of 2019 novel coronavirus disease (COVID-19) Z86.16       Past Medical History:        Diagnosis Date    H. pylori infection        Past Surgical History:        Procedure Laterality Date    ESOPHAGOSCOPY N/A 09/29/2022    ESOPHAGOSCOPY BIOPSY performed by Burgess Zane MD at [address] EXTRACTION N/A        Social History:    Social History     Tobacco Use    Smoking status: Never    Smokeless tobacco: Never   Substance Use Topics    Alcohol use: Never                                Counseling given: Not Answered      Vital Signs (Current):   Vitals:    12/21/22 1548   Weight: 107 lb (48.5 kg)   Height: 5' 8\" (1.727 m)                                              BP Readings from Last 3 Encounters:   09/29/22 109/86   08/22/22 110/80   08/17/22 114/75       NPO Status:                                                                                 BMI:   Wt Readings from Last 3 Encounters:   09/29/22 110 lb (49.9 kg)   08/22/22 109 lb (49.4 kg)   08/17/22 108 lb (49 kg)     Body mass index is 16.27 kg/m².     CBC:   Lab Results   Component Value Date/Time    WBC 7.5 08/19/2022 07:54 AM    RBC 4.66 08/19/2022 07:54 AM    HGB 14.7 08/19/2022 07:54 AM    HCT 46.9 08/19/2022 07:54 AM    .6 08/19/2022 07:54 AM    RDW 12.3 08/19/2022 07:54 AM     08/19/2022 07:54 AM       CMP:   Lab Results   Component Value Date/Time     08/19/2022 07:54 AM    K 4.3 08/19/2022 07:54 AM     08/19/2022 07:54 AM    CO2 24 08/19/2022 07:54 AM    BUN 8 08/19/2022 07:54 AM    CREATININE 0.7 08/19/2022 07:54 AM    GFRAA >60 08/19/2022 07:54 AM    LABGLOM >60 08/19/2022 07:54 AM    GLUCOSE 84 08/19/2022 07:54 AM    PROT 7.6 08/19/2022 07:54 AM    CALCIUM 9.9 08/19/2022 07:54 AM    BILITOT 0.8 08/19/2022 07:54 AM    ALKPHOS 90 08/19/2022 07:54 AM    AST 21 08/19/2022 07:54 AM    ALT 18 08/19/2022 07:54 AM       POC Tests: No results for input(s): POCGLU, POCNA, POCK, POCCL, POCBUN, POCHEMO, POCHCT in the last 72 hours. Coags: No results found for: PROTIME, INR, APTT    HCG (If Applicable): No results found for: PREGTESTUR, PREGSERUM, HCG, HCGQUANT     ABGs: No results found for: PHART, PO2ART, NFP0NSX, CRE2TMV, BEART, E1ZRRNRN     Type & Screen (If Applicable):  No results found for: LABABO, LABRH    Drug/Infectious Status (If Applicable):  No results found for: HIV, HEPCAB    COVID-19 Screening (If Applicable): No results found for: COVID19        Anesthesia Evaluation  Patient summary reviewed no history of anesthetic complications:   Airway: Mallampati: I  TM distance: >3 FB   Neck ROM: full  Mouth opening: > = 3 FB   Dental: normal exam         Pulmonary:Negative Pulmonary ROS breath sounds clear to auscultation                             Cardiovascular:  Exercise tolerance: good (>4 METS),           Rhythm: regular  Rate: normal           Beta Blocker:  Not on Beta Blocker         Neuro/Psych:   Negative Neuro/Psych ROS              GI/Hepatic/Renal:   (+) GERD:,          ROS comment: Hs esophageal stricture. Endo/Other: Negative Endo/Other ROS                    Abdominal:             Vascular: negative vascular ROS. Other Findings:           Anesthesia Plan      MAC     ASA 2       Induction: intravenous. Anesthetic plan and risks discussed with patient. Plan discussed with CRNA. Pre Anesthesia Evaluation complete. Anesthesia plan, risks, benefits, alternatives, and personal involved discussed with patient. Patients and/or legal guardian verbalized an understanding  and agreed to proceed.   Lee Hawk DO  12/29/2022

## 2022-12-28 NOTE — PROGRESS NOTES
Spoke with patient and he will arrive at 1100 at [facility] on 12/29/2022 for his procedure at 1230. . IV order in epic, placed by Renata BROWNING.

## 2022-12-29 ENCOUNTER — HOSPITAL ENCOUNTER (OUTPATIENT)
Age: 32
Setting detail: OUTPATIENT SURGERY
Discharge: HOME | End: 2022-12-29
Attending: SPECIALIST | Admitting: SPECIALIST
Payer: COMMERCIAL

## 2022-12-29 ENCOUNTER — ANESTHESIA (OUTPATIENT)
Dept: ENDOSCOPY | Age: 32
End: 2022-12-29
Payer: COMMERCIAL

## 2022-12-29 VITALS
WEIGHT: 107 LBS | OXYGEN SATURATION: 100 % | DIASTOLIC BLOOD PRESSURE: 78 MMHG | HEART RATE: 85 BPM | SYSTOLIC BLOOD PRESSURE: 107 MMHG | TEMPERATURE: 97.4 F | HEIGHT: 68 IN | BODY MASS INDEX: 16.22 KG/M2 | RESPIRATION RATE: 16 BRPM

## 2022-12-29 DIAGNOSIS — R13.10 DYSPHAGIA, UNSPECIFIED TYPE: ICD-10-CM

## 2022-12-29 PROCEDURE — 3700000001 HC ADD 15 MINUTES (ANESTHESIA): Performed by: SPECIALIST

## 2022-12-29 PROCEDURE — 88305 TISSUE EXAM BY PATHOLOGIST: CPT | Performed by: PATHOLOGY

## 2022-12-29 PROCEDURE — 3700000000 HC ANESTHESIA ATTENDED CARE: Performed by: SPECIALIST

## 2022-12-29 PROCEDURE — 2709999900 HC NON-CHARGEABLE SUPPLY: Performed by: SPECIALIST

## 2022-12-29 PROCEDURE — 2580000003 HC RX 258: Performed by: ANESTHESIOLOGY

## 2022-12-29 PROCEDURE — 3609012400 HC EGD TRANSORAL BIOPSY SINGLE/MULTIPLE: Performed by: SPECIALIST

## 2022-12-29 PROCEDURE — 7100000010 HC PHASE II RECOVERY - FIRST 15 MIN: Performed by: SPECIALIST

## 2022-12-29 PROCEDURE — 7100000011 HC PHASE II RECOVERY - ADDTL 15 MIN: Performed by: SPECIALIST

## 2022-12-29 PROCEDURE — 6360000002 HC RX W HCPCS: Performed by: NURSE ANESTHETIST, CERTIFIED REGISTERED

## 2022-12-29 PROCEDURE — 2500000003 HC RX 250 WO HCPCS: Performed by: NURSE ANESTHETIST, CERTIFIED REGISTERED

## 2022-12-29 RX ORDER — LIDOCAINE HYDROCHLORIDE 20 MG/ML
INJECTION, SOLUTION INFILTRATION; PERINEURAL PRN
Status: DISCONTINUED | OUTPATIENT
Start: 2022-12-29 | End: 2022-12-29 | Stop reason: SDUPTHER

## 2022-12-29 RX ORDER — GLYCOPYRROLATE 1 MG/5 ML
SYRINGE (ML) INTRAVENOUS PRN
Status: DISCONTINUED | OUTPATIENT
Start: 2022-12-29 | End: 2022-12-29 | Stop reason: SDUPTHER

## 2022-12-29 RX ORDER — PROPOFOL 10 MG/ML
INJECTION, EMULSION INTRAVENOUS PRN
Status: DISCONTINUED | OUTPATIENT
Start: 2022-12-29 | End: 2022-12-29 | Stop reason: SDUPTHER

## 2022-12-29 RX ORDER — SODIUM CHLORIDE, SODIUM LACTATE, POTASSIUM CHLORIDE, CALCIUM CHLORIDE 600; 310; 30; 20 MG/100ML; MG/100ML; MG/100ML; MG/100ML
INJECTION, SOLUTION INTRAVENOUS CONTINUOUS
Status: DISCONTINUED | OUTPATIENT
Start: 2022-12-29 | End: 2022-12-29 | Stop reason: HOSPADM

## 2022-12-29 RX ADMIN — PROPOFOL 240 MG: 10 INJECTION, EMULSION INTRAVENOUS at 12:33

## 2022-12-29 RX ADMIN — LIDOCAINE HYDROCHLORIDE 60 MG: 20 INJECTION, SOLUTION INFILTRATION; PERINEURAL at 12:33

## 2022-12-29 RX ADMIN — SODIUM CHLORIDE, POTASSIUM CHLORIDE, SODIUM LACTATE AND CALCIUM CHLORIDE: 600; 310; 30; 20 INJECTION, SOLUTION INTRAVENOUS at 11:44

## 2022-12-29 RX ADMIN — Medication 0.2 MG: at 12:33

## 2022-12-29 ASSESSMENT — PAIN - FUNCTIONAL ASSESSMENT: PAIN_FUNCTIONAL_ASSESSMENT: 0-10

## 2022-12-29 ASSESSMENT — PAIN SCALES - GENERAL
PAINLEVEL_OUTOF10: 0
PAINLEVEL_OUTOF10: 0

## 2022-12-29 NOTE — BRIEF OP NOTE
Brief Postoperative Note      Tommie Matute is a 27 y.o. male     Pre-operative Diagnosis: DYSPHAGIA / H/O EoE    Post-operative Diagnosis: TIGHT ESOPHAGEAL STRICTURE AT 20 CM-BX    Procedure: FLEXIBLE ESOPHAGOSCOPY WITH BX    Anesthesia: MAC    Surgeons/Assistants:Dimple Zambrano MD     Estimated Blood Loss: MINIMAL    Complications: None    Specimens: were obtained  REC-- CPM WITH PPI / F/U Aquiles Torres / America Cheek / CONSULT TO DIETICIAN / REPEAT EGD WITH DILATION LATER WITH ELIA Quiñonez MD   12/29/2022   12:48 PM

## 2022-12-29 NOTE — ANESTHESIA POSTPROCEDURE EVALUATION
Department of Anesthesiology  Postprocedure Note    Patient: Kamran Cameron  MRN: [id number]  YOB: 1990  Date of evaluation: 12/29/2022      Procedure Summary     Date: 12/29/22 Room / Location: [address]    Anesthesia Start: 1231 Anesthesia Stop:     Procedure: FLEXIBLE ESOPHAGOSCOPY BIOPSY Diagnosis:       Dysphagia, unspecified type      (Dysphagia, unspecified type [R13.10])    Surgeons: Isac Ibanez MD Responsible Provider: Ninfa Cisneros MD    Anesthesia Type: MAC ASA Status: 2          Anesthesia Type: MAC    Kaykay Phase I:      Kaykay Phase II:        Anesthesia Post Evaluation    Patient location during evaluation: PACU  Patient participation: complete - patient participated  Level of consciousness: sleepy but conscious  Pain score: 0  Airway patency: patent  Nausea & Vomiting: no nausea and no vomiting  Complications: no  Cardiovascular status: hemodynamically stable  Respiratory status: acceptable, room air and spontaneous ventilation  Hydration status: stable

## 2022-12-29 NOTE — PROGRESS NOTES
1304- Pt returned to SDS rm 10, respirations even and unlabored, VSS, pt alert and oriented.  Report at bedside w/ [address] provided to pt, tolerating well   1328- Discharge instructions reviewed w/ pt and pt wife at bedside, verbalized understanding   1330- Pt dressing w/ assistance from family member   56- Pt discharged via car w/ pt wife driving

## 2022-12-29 NOTE — DISCHARGE INSTRUCTIONS
EGD    DR. ORTIZ    OFFICE NUMBER [phone]    FOLLOW UP APPOINTMENT IN 4 WEEKS OR AS NEEDED. REPEAT PROCEDURE IN AS  NEEDED. TEST ORDERED:DETERMINED BY PATHOLOGY. What to Expect at Home  Your Recovery:  The only discomfort after your EGD is generally limited to a mild soreness of the throat, which may last a day or two. Call your physician immediately if you have severe chest pain, shortness of breath or a temperature of 100 degrees or higher if taken orally. How can you care for yourself at home? Activity  Rest as much as you need to after you go home. You should be able to go back to your usual activities the day after the test.  Diet  Follow your doctor's directions for eating after the test.  Drink plenty of fluids (unless your doctor has told you not to). Medications  If you have a sore throat the day after the test, use an over-the-counter spray to numb your throat. Your doctor will tell you if and when you can restart your medicines. He or she will also give you instructions about taking any new medicines. If you take blood thinners, such as warfarin (Coumadin), clopidogrel (Plavix), or aspirin, be sure to talk to your doctor. He or she will tell you if and when to start taking those medicines again. Make sure that you understand exactly what your doctor wants you to do. If a biopsy was done during the test, your doctor may tell you not to take aspirin or other anti-inflammatory medicines for a few days. These include ibuprofen (Advil, Motrin) and naproxen (Aleve). DO NOT DRINK ANYTHING WITH ALCOHOL TODAY. Other instructions:Anesthesia  For your safety, do not drive or operate machinery for 24 hours. Do not sign legal documents or make major decisions for 24 hours. The anesthesia can make it hard for you to fully understand what you are agreeing to. Follow-up care is a key part of your treatment and safety.  Be sure to make and go to all appointments, and call your doctor if you are having problems. It's also a good idea to know your test results and keep a list of the medicines you take. When should you call for help? [address] [facility] [address] [place] [phone]  Call 911 anytime you think you may need emergency care. For example, call if:  You passed out (lost consciousness). You cough up blood. You vomit blood or what looks like coffee grounds. You pass maroon or very bloody stools. Call your doctor now or seek immediate medical care if:  You have trouble swallowing. You have belly pain. Your stools are black and tarlike or have streaks of blood. You are sick to your stomach or cannot keep fluids down. Watch closely for changes in your health, and be sure to contact your doctor if:  Your throat still hurts after a day or two. You do not get better as expected. Where can you learn more? Go to [email or web address]://[email or web address] LabpeJimdo.Mobilygen. org and sign in to your [employer] account. Enter S788 in the [employer] box to learn more about Upper GI Endoscopy: What to Expect at Home.     If you do not have an account, please click on the Sign Up Now link. © 0530-3563 Healthwise, Incorporated. Care instructions adapted under license by [facility] This care instruction is for use with your licensed healthcare professional. If you have questions about a medical condition or this instruction, always ask your healthcare professional. [place] [address] any warranty or liability for your use of this information. Content Version: 36.9.997172; Current as of: November 20, 2015             [facility]  [phone]    Do not drive, work around [address] [place], [place] [address]th  or use equipment. Do not drink any alcoholic beverages. Do not smoke while alone. Avoid making important decisions. Plan to spend a quiet, relaxed evening @ home. Resume normal activities as you begin to feel better.   Eat lightly for your first meal, then gradually increase your diet to what is normal for you. In case of nausea, avoid food and drink only clear liquids. Resume food as nausea ceases. Notify your surgeon if you experience fever, chills, large amount of bleeding, difficulty breathing, persistent nausea and vomiting or any other disturbing problem. Call for a follow-up appointment with your surgeon. Advance Care Planning  People with COVID-19 may have no symptoms, mild symptoms, such as fever, cough, and shortness of breath or they may have more severe illness, developing severe and fatal pneumonia. As a result, Advance Care Planning with attention to naming a health care decision maker (someone you trust to make healthcare decisions for you if you could not speak for yourself) and sharing other health care preferences is important BEFORE a possible health crisis. Please contact your Primary Care Provider to discuss Advance Care Planning. Preventing the Spread of Coronavirus Disease 2019 in Homes and Residential Communities  For the most recent information go to CodaMations.fi    Prevention steps for People with confirmed or suspected COVID-19 (including persons under investigation) who do not need to be hospitalized  and   People with confirmed COVID-19 who were hospitalized and determined to be medically stable to go home    Your healthcare provider and public health staff will evaluate whether you can be cared for at home. If it is determined that you do not need to be hospitalized and can be isolated at home, you will be monitored by staff from your local or state health department. You should follow the prevention steps below until a healthcare provider or local or state health department says you can return to your normal activities.   Stay home except to get medical care  People who are mildly ill with COVID-19 are able to isolate at home during their illness. You should restrict activities outside your home, except for getting medical care. Do not go to work, school, or public areas. Avoid using public transportation, ride-sharing, or taxis. Separate yourself from other people and animals in your home  People: As much as possible, you should stay in a specific room and away from other people in your home. Also, you should use a separate bathroom, if available. Animals: You should restrict contact with pets and other animals while you are sick with COVID-19, just like you would around other people. Although there have not been reports of pets or other animals becoming sick with COVID-19, it is still recommended that people sick with COVID-19 limit contact with animals until more information is known about the virus. When possible, have another member of your household care for your animals while you are sick. If you are sick with COVID-19, avoid contact with your pet, including petting, snuggling, being kissed or licked, and sharing food. If you must care for your pet or be around animals while you are sick, wash your hands before and after you interact with pets and wear a facemask. Call ahead before visiting your doctor  If you have a medical appointment, call the healthcare provider and tell them that you have or may have COVID-19. This will help the healthcare providers office take steps to keep other people from getting infected or exposed. Wear a facemask  You should wear a facemask when you are around other people (e.g., sharing a room or vehicle) or pets and before you enter a healthcare providers office. If you are not able to wear a facemask (for example, because it causes trouble breathing), then people who live with you should not stay in the same room with you, or they should wear a facemask if they enter your room. Cover your coughs and sneezes  Cover your mouth and nose with a tissue when you cough or sneeze.  Throw used tissues in a lined trash can. Immediately wash your hands with soap and water for at least 20 seconds or, if soap and water are not available, clean your hands with an alcohol-based hand  that contains at least 60% alcohol. Clean your hands often  Wash your hands often with soap and water for at least 20 seconds, especially after blowing your nose, coughing, or sneezing; going to the bathroom; and before eating or preparing food. If soap and water are not readily available, use an alcohol-based hand  with at least 60% alcohol, covering all surfaces of your hands and rubbing them together until they feel dry. Soap and water are the best option if hands are visibly dirty. Avoid touching your eyes, nose, and mouth with unwashed hands. Avoid sharing personal household items  You should not share dishes, drinking glasses, cups, eating utensils, towels, or bedding with other people or pets in your home. After using these items, they should be washed thoroughly with soap and water. Clean all high-touch surfaces everyday  High touch surfaces include counters, tabletops, doorknobs, bathroom fixtures, toilets, phones, keyboards, tablets, and bedside tables. Also, clean any surfaces that may have blood, stool, or body fluids on them. Use a household cleaning spray or wipe, according to the label instructions. Labels contain instructions for safe and effective use of the cleaning product including precautions you should take when applying the product, such as wearing gloves and making sure you have good ventilation during use of the product. Monitor your symptoms  Seek prompt medical attention if your illness is worsening (e.g., difficulty breathing). Before seeking care, call your healthcare provider and tell them that you have, or are being evaluated for, COVID-19. Put on a facemask before you enter the facility.  These steps will help the healthcare providers office to keep other people in the office or waiting room from getting infected or exposed. Ask your healthcare provider to call the local or state health department. Persons who are placed under active monitoring or facilitated self-monitoring should follow instructions provided by their local health department or occupational health professionals, as appropriate. When working with your local health department check their available hours. If you have a medical emergency and need to call 911, notify the dispatch personnel that you have, or are being evaluated for COVID-19. If possible, put on a facemask before emergency medical services arrive. Discontinuing home isolation  Patients with confirmed COVID-19 should remain under home isolation precautions until the risk of secondary transmission to others is thought to be low. The decision to discontinue home isolation precautions should be made on a case-by-case basis, in consultation with healthcare providers and state and local health departments.

## 2022-12-29 NOTE — PROGRESS NOTES
Pt alert and oriented x 4. Wife at bedside. Pre-op questions asked and answered appropriately by pt. Name, , armband verified, procedure, allergies, implants, last PO intake, history, meds.

## 2022-12-30 NOTE — OP NOTE
[address], [address] [place] [address]                                OPERATIVE REPORT    PATIENT NAME: Micheal Hamilton                  :        1990  MED REC NO:   [id number]                          ROOM:  ACCOUNT NO:   [de-identified]                           ADMIT DATE: 2022  PROVIDER:     Simeon Valdivia MD    DATE OF PROCEDURE:  2022    Flexible esophagoscopy with biopsy report. PRIMARY CARE PROVIDER:  Laree Cabot, MD    SURGEON:  Simeon Valdivia MD.    PREOPERATIVE DIAGNOSIS:  History of eosinophilic esophagitis/dysphagia. PREMEDICATION:  Please refer to the anesthesiologist's notes. PROCEDURE:  The patient was placed in the left lateral decubitus  position, and a video Olympus gastroscope was introduced in the back of  throat and was advanced into the esophagus. ESOPHAGUS:  The entire esophagus was narrowed and a fairly tight  stricture was noted at 20 cm with the result of the adult gastroscope  could not be advanced through the stricture distally into the esophagus. The site of the stricture was friable as well with some bleeding. Endoscopically, no mass lesions were seen up to 20 cm. Multiple  biopsies were obtained from the mid esophagus and submitted for  histopathology to rule out eosinophilic esophagitis. POSTOPERATIVE DIAGNOSIS:  Narrow caliber of the entire esophagus with  the fairly tight stricture noted at 20 cm and rule out EOE. RECOMMENDATION:  1. Antireflux measures. 2.  We will continue the patient on omeprazole 40 mg q.a.m.  3.  We will follow up on the above path report. 4.  We will refer the patient to a dietitian also for a six-food  elimination diet. 5.  We will give the patient a trial with Flovent also. ____ the  patient was unable to tolerate budesonide suspension. 6.  We will refer the patient for repeat EGD with dilatation with the PD  gastroscope.   7.  Follow up in office in four weeks' time and the patient will also be  followed up by his primary physician, Dr. Reuben Solares. The patient tolerated the procedure well. There were no postop  complications.   The blood loss during the procedure was minimal.        Patsy Neff MD    D: 12/29/2022 12:54:46       T: 12/29/2022 12:57:07     [email or web address]  Job#: [id number]     Doc#: [id number]    CC:  Reuben Solares MD

## 2023-01-12 ENCOUNTER — HOSPITAL ENCOUNTER (OUTPATIENT)
Dept: DIABETES SERVICES | Age: 33
Setting detail: THERAPIES SERIES
Discharge: HOME | End: 2023-01-12
Payer: COMMERCIAL

## 2023-01-12 PROCEDURE — 97802 MEDICAL NUTRITION INDIV IN: CPT

## 2023-01-13 ENCOUNTER — HOSPITAL ENCOUNTER (OUTPATIENT)
Age: 33
Setting detail: SPECIMEN
Discharge: HOME | End: 2023-01-13
Payer: COMMERCIAL

## 2023-01-13 LAB
ADENOVIRUS F 40 41 PCR: NOT DETECTED
ASTROVIRUS PCR: NOT DETECTED
CAMPYLOBACTER PCR: NOT DETECTED
CRYPTOSPORIDIUM PCR: NOT DETECTED
CYCLOSPORA CAYETANENSIS PCR: NOT DETECTED
E COLI 0157 PCR: NOT DETECTED
E COLI ENTEROAGGREGATIVE PCR: NOT DETECTED
E COLI ENTEROPATHOGENIC PCR: NOT DETECTED
E COLI ENTEROTOXIGENIC PCR: NOT DETECTED
E COLI SHIGA LIKE TOXIN PCR: NOT DETECTED
E COLI SHIGELLA/ENTEROINVASIVE PCR: NOT DETECTED
ENTAMOEBA HISTOLYTICA PCR: NOT DETECTED
GIARDIA LAMBLIA PCR: NOT DETECTED
NOROVIRUS GI GII PCR: NOT DETECTED
PLESIOMONAS SHIGELLOIDES PCR: NOT DETECTED
ROTAVIRUS A PCR: NOT DETECTED
SALMONELLA PCR: NOT DETECTED
SAPOVIRUS PCR: NOT DETECTED
VIBRIO CHOLERAE PCR: NOT DETECTED
VIBRIO PCR: NOT DETECTED
YERSINIA ENTEROCOLITICA PCR: NOT DETECTED

## 2023-01-13 PROCEDURE — 87209 SMEAR COMPLEX STAIN: CPT

## 2023-01-13 PROCEDURE — 87507 IADNA-DNA/RNA PROBE TQ 12-25: CPT

## 2023-01-13 PROCEDURE — 87177 OVA AND PARASITES SMEARS: CPT

## 2023-01-13 NOTE — PROGRESS NOTES
Outpatient Medical Nutrition Therapy   01/12/2023  16:00-16:45    Reason for referral: dysphagia (K97.00), eosinophilic esophagitis (T33.3)    Client History:    Pertinent medications:   Current Outpatient Medications   Medication Instructions    amoxicillin-clarithromycin-lansoprazole (PREVPAC) combo pack 1 dose twice per day for 14 days    lansoprazole (PREVACID SOLUTAB) 30 mg, Oral, 2 TIMES DAILY    omeprazole (PRILOSEC) 20 mg, Oral, DAILY    vitamin D3 (CHOLECALCIFEROL) 1,000 Units, Oral, DAILY       Social hx: Lives with spouse. Works from home. Has live in [place] ~3 years, prior to this lived in [place] and [place]. [place] 58. Pertinent Medical hx: + H. Pylori with recent 4 weeks of antibiotics, COVID-19 in 2019, EGD last month - esophageal stricture has another EGD planned at [facility] with smaller scope     Activity habits: Less active in past years with health issues, recently trying to walk for 20 minutes x 2 per day    Food/nutrition habits: Usually follows vegan diet. Tried eating fish last year but has stopped. Avoids spicy foods including black pepper. Able to tolerate solid foods but eats very slowly to thoroughly chew foods. Breakfast meal usually 2 apples or some other fruits, homemade almond milk, homemade bread. Lunch and supper meals: luz rice with vegetables (carrots, green beans, kale, bok maricarmen, zucchini, onions, potatoes) and beans (black beans or lentils). May have tofu sometimes or other meat analogs. Uses olive or coconut oil. May have avocado with meals. Does not eat many sweets/desserts, tries to avoid sugar and only uses honey for sweetener. Drinks water, goal 4 bottles per day. Biochemical data: last labs from August 2022 low vitamin D-now supplemented,  B12 wnl, Hb wnl       Anthropometrics:    Ht:  68\" Wt:  105# (home scale)   IBW:   139-154#   % of IBW: 75          BMI: 16 (underweight)    Weight hx: Highest adult weight 128# several years ago.  Progressive weight loss over years with difficulty gaining. Wt Readings from Last 3 Encounters:   12/21/22 107 lb (48.5 kg)   09/29/22 110 lb (49.9 kg)   08/22/22 109 lb (49.4 kg)      Underweight with muscle and fat wasting, severe malnutrition in chronic illness. Diet hx: Vegan. Tried gluten free diet last year but did not feel any improvement in GI symptoms. Estimated needs: 5111-2511 calories/day (30-35 deandre/kg current with additional 500 calories/day for gain), 58-72 g protein/day (1.2-1.5 g/kg current weight)    Nutrition dx: Underweight related to eosinophilic esophagitis with dysphagia as evidenced by low BMI, appears malnourished, difficulty with weight gain     Nutrition Prescription:  elimination diet with avoidance of peanut/tree nuts and soy     Nutrition Interventions: Discussion regarding plan with EOE for elimination diet to help determine potential food triggers for symptoms. Currently does not eat dairy, eggs, or fish. Willing to trial elimination of peanuts/tree nuts and soy products for at least 2 weeks. Encouraged to keep food records to document overall intake and symptoms. When GI upset, has cramping and feeling of slow GI transit. Discussed need for adequate calorie and protein intake to gain weight. May also use vegan oral nutrition supplement using pea protein for protein source. Encouraged oral nutrition supplement if unable to gain weight, most provide at least 220 calories, 16 g protein per serving. Encouraged to continue eating slowly, chew well. Encouraged to communicate with physician if unable to fully tolerate food or if continues with weight loss.       Nutrition related goals: Trial elimination of peanut/tree nuts and soy     Adherence/barriers to goals: no barriers at this time, willing to trial diet changes     Primary learner: attended with spouse, spouse very supportive    Education materials provided: elimination diet handouts   Method of education:   Explanation       Handouts   Teach back Response to education:    Verbalized understanding            Rec/plan: Patient to continue follow up with PCP and specialists  Additional follow up as needed/desires/contact number provided

## 2023-01-17 ENCOUNTER — HOSPITAL ENCOUNTER (OUTPATIENT)
Age: 33
Setting detail: SPECIMEN
Discharge: HOME | End: 2023-01-17
Payer: COMMERCIAL

## 2023-01-17 LAB
REASON FOR REJECTION: NORMAL
REJECTED TEST: NORMAL

## 2023-01-17 PROCEDURE — 87338 HPYLORI STOOL AG IA: CPT

## 2023-01-19 LAB — H PYLORI ANTIGEN STOOL: POSITIVE

## 2023-03-31 ENCOUNTER — OFFICE VISIT (OUTPATIENT)
Dept: FAMILY MEDICINE CLINIC | Age: 33
End: 2023-03-31
Payer: COMMERCIAL

## 2023-03-31 ENCOUNTER — HOSPITAL ENCOUNTER (OUTPATIENT)
Age: 33
Setting detail: SPECIMEN
Discharge: HOME | End: 2023-03-31
Payer: COMMERCIAL

## 2023-03-31 VITALS
OXYGEN SATURATION: 93 % | TEMPERATURE: 98.5 F | SYSTOLIC BLOOD PRESSURE: 118 MMHG | WEIGHT: 107.8 LBS | HEART RATE: 111 BPM | BODY MASS INDEX: 16.39 KG/M2 | DIASTOLIC BLOOD PRESSURE: 74 MMHG

## 2023-03-31 DIAGNOSIS — K20.0 EOSINOPHILIC ESOPHAGITIS: ICD-10-CM

## 2023-03-31 DIAGNOSIS — R09.81 SINUS CONGESTION: Primary | ICD-10-CM

## 2023-03-31 PROCEDURE — G8484 FLU IMMUNIZE NO ADMIN: HCPCS | Performed by: FAMILY MEDICINE

## 2023-03-31 PROCEDURE — 1036F TOBACCO NON-USER: CPT | Performed by: FAMILY MEDICINE

## 2023-03-31 PROCEDURE — 99213 OFFICE O/P EST LOW 20 MIN: CPT | Performed by: FAMILY MEDICINE

## 2023-03-31 PROCEDURE — G8427 DOCREV CUR MEDS BY ELIG CLIN: HCPCS | Performed by: FAMILY MEDICINE

## 2023-03-31 PROCEDURE — G8418 CALC BMI BLW LOW PARAM F/U: HCPCS | Performed by: FAMILY MEDICINE

## 2023-03-31 PROCEDURE — U0005 INFEC AGEN DETEC AMPLI PROBE: HCPCS

## 2023-03-31 PROCEDURE — U0003 INFECTIOUS AGENT DETECTION BY NUCLEIC ACID (DNA OR RNA); SEVERE ACUTE RESPIRATORY SYNDROME CORONAVIRUS 2 (SARS-COV-2) (CORONAVIRUS DISEASE [COVID-19]), AMPLIFIED PROBE TECHNIQUE, MAKING USE OF HIGH THROUGHPUT TECHNOLOGIES AS DESCRIBED BY CMS-2020-01-R: HCPCS

## 2023-03-31 NOTE — PROGRESS NOTES
congestion  COVID-19      2. Eosinophilic esophagitis  [facility] - Charmayne Crass, MD, Allergy, [place]              Plan:          F/u by phone or MY Chart if symptoms worsen         Tylenol or Ibuprofen for fever or muscle aches. Re-assurance that likely has a cold. Could last 1-2 weeks.

## 2023-04-01 LAB
SARS-COV-2 RNA RESP QL NAA+PROBE: NOT DETECTED
SOURCE: NORMAL

## 2023-05-09 ENCOUNTER — OFFICE VISIT (OUTPATIENT)
Dept: FAMILY MEDICINE CLINIC | Age: 33
End: 2023-05-09
Payer: COMMERCIAL

## 2023-05-09 VITALS
DIASTOLIC BLOOD PRESSURE: 70 MMHG | BODY MASS INDEX: 16.7 KG/M2 | SYSTOLIC BLOOD PRESSURE: 102 MMHG | TEMPERATURE: 98.8 F | OXYGEN SATURATION: 95 % | HEART RATE: 94 BPM | WEIGHT: 109.8 LBS

## 2023-05-09 DIAGNOSIS — R63.6 UNDERWEIGHT: Primary | ICD-10-CM

## 2023-05-09 DIAGNOSIS — R07.89 TIGHT CHEST: ICD-10-CM

## 2023-05-09 DIAGNOSIS — R09.89 SINUS COMPLAINT: ICD-10-CM

## 2023-05-09 DIAGNOSIS — B37.0 THRUSH: ICD-10-CM

## 2023-05-09 PROCEDURE — 1036F TOBACCO NON-USER: CPT | Performed by: FAMILY MEDICINE

## 2023-05-09 PROCEDURE — G8418 CALC BMI BLW LOW PARAM F/U: HCPCS | Performed by: FAMILY MEDICINE

## 2023-05-09 PROCEDURE — G8427 DOCREV CUR MEDS BY ELIG CLIN: HCPCS | Performed by: FAMILY MEDICINE

## 2023-05-09 PROCEDURE — 99214 OFFICE O/P EST MOD 30 MIN: CPT | Performed by: FAMILY MEDICINE

## 2023-05-09 PROCEDURE — 93000 ELECTROCARDIOGRAM COMPLETE: CPT | Performed by: FAMILY MEDICINE

## 2023-05-09 RX ORDER — PANTOPRAZOLE SODIUM 40 MG/1
40 GRANULE, DELAYED RELEASE ORAL PRN
COMMUNITY
Start: 2023-03-29

## 2023-05-09 ASSESSMENT — PATIENT HEALTH QUESTIONNAIRE - PHQ9
1. LITTLE INTEREST OR PLEASURE IN DOING THINGS: 0
SUM OF ALL RESPONSES TO PHQ QUESTIONS 1-9: 0
SUM OF ALL RESPONSES TO PHQ QUESTIONS 1-9: 0
SUM OF ALL RESPONSES TO PHQ9 QUESTIONS 1 & 2: 0
SUM OF ALL RESPONSES TO PHQ QUESTIONS 1-9: 0
SUM OF ALL RESPONSES TO PHQ QUESTIONS 1-9: 0
2. FEELING DOWN, DEPRESSED OR HOPELESS: 0

## 2023-05-09 NOTE — PROGRESS NOTES
Patient ID: Arjun De Los Santos 1990    Chief Complaint   Patient presents with    Other     Dry mouth started on Friday white spots on tongue  Still having lots of spit   Had endo and things improving  Still feels like something is stuck in throat   Says when he was taking amoxicillin  and clarithromycin together noticed teeth was turning white, but only lasted a few days   The allergy referral that was placed months ago never called him     Referral - General     ENT         HPI    Thrush: scrapes it off hs tongue frequently. Tongue is sore    Digestive problem:  saw  [facility] and had esophageal dilation. Slowly getting better. Saw dietician who gave him some tips to gain weight    Sinus problems: asking to see ENT. Thinks there may be a connections between his eosinoph esophagitis and sinuses. Chest tightness at times:   still with some esophageal problems. Had esophagus dilated  Patient Active Problem List   Diagnosis    Vegan    Underweight    History of 2019 novel coronavirus disease (COVID-19)       Past Surgical History:   Procedure Laterality Date    ESOPHAGOSCOPY N/A 09/29/2022    ESOPHAGOSCOPY BIOPSY performed by Niya Echeverria MD at [facility] N/A 12/29/2022    EGD BIOPSY performed by Niya Echeverria MD at [address] [place] [address]        Family History   Problem Relation Age of Onset    Hypertension Father     Asthma Maternal Grandmother        Current Outpatient Medications on File Prior to Visit   Medication Sig Dispense Refill    pantoprazole sodium (PROTONIX) 40 MG PACK packet Take 1 packet by mouth as needed       No current facility-administered medications on file prior to visit. Objective:           Physical Exam  Vitals and nursing note reviewed. Constitutional:       General: He is not in acute distress. Appearance: He is underweight. HENT:      Head: Normocephalic and atraumatic.       Right Ear: Hearing,

## 2023-05-23 ENCOUNTER — HOSPITAL ENCOUNTER (OUTPATIENT)
Age: 33
Setting detail: SPECIMEN
Discharge: HOME | End: 2023-05-23
Payer: COMMERCIAL

## 2023-05-23 LAB
ASTROVIRUS PCR: NOT DETECTED
C CAYETANENSIS DNA SPEC QL NAA+PROBE: NOT DETECTED
CAMPY SP DNA.DIARRHEA STL QL NAA+PROBE: NOT DETECTED
CRYPTOSP DNA SPEC QL NAA+PROBE: NOT DETECTED
E COLI DNA SPEC QL NAA+PROBE: NOT DETECTED
E COLI ENTEROAGGREGATIVE PCR: NOT DETECTED
E COLI ENTEROPATHOGENIC PCR: NOT DETECTED
E COLI ENTEROTOXIGENIC PCR: NOT DETECTED
E COLI O157H7 DNA SPEC QL NAA+PROBE: NOT DETECTED
E HISTOLYT DNA SPEC QL NAA+PROBE: NOT DETECTED
EC STX1+STX2 + H7 FLIC SPEC NAA+PROBE: NOT DETECTED
G LAMBLIA DNA SPEC QL NAA+PROBE: NOT DETECTED
HADV DNA SPEC QL NAA+PROBE: NOT DETECTED
NOROVIRUS RNA SPEC QL NAA+PROBE: NOT DETECTED
P SHIGELLOIDES DNA STL QL NAA+PROBE: NOT DETECTED
REASON FOR REJECTION: NORMAL
REJECTED TEST: NORMAL
RV RNA SPEC QL NAA+PROBE: NOT DETECTED
SALMONELLA DNA SPEC QL NAA+PROBE: NOT DETECTED
SAPOVIRUS PCR: NOT DETECTED
V CHOLERAE DNA SPEC QL NAA+PROBE: NOT DETECTED
VIBRIO DNA SPEC NAA+PROBE: NOT DETECTED
YERSINIA DNA SPEC NAA+PROBE: NOT DETECTED

## 2023-05-23 PROCEDURE — 87338 HPYLORI STOOL AG IA: CPT

## 2023-05-23 PROCEDURE — 87177 OVA AND PARASITES SMEARS: CPT

## 2023-05-23 PROCEDURE — 87209 SMEAR COMPLEX STAIN: CPT

## 2023-05-23 PROCEDURE — 87507 IADNA-DNA/RNA PROBE TQ 12-25: CPT

## 2023-05-25 LAB — H PYLORI AG STL QL IA: NEGATIVE

## 2023-05-27 LAB — INTERPRETATION: NEGATIVE

## 2023-06-06 ENCOUNTER — HOSPITAL ENCOUNTER (EMERGENCY)
Age: 33
Discharge: HOME | End: 2023-06-06
Attending: EMERGENCY MEDICINE
Payer: COMMERCIAL

## 2023-06-06 ENCOUNTER — APPOINTMENT (OUTPATIENT)
Dept: GENERAL RADIOLOGY | Age: 33
End: 2023-06-06
Payer: COMMERCIAL

## 2023-06-06 VITALS
DIASTOLIC BLOOD PRESSURE: 75 MMHG | TEMPERATURE: 98 F | RESPIRATION RATE: 14 BRPM | HEART RATE: 88 BPM | HEIGHT: 69 IN | OXYGEN SATURATION: 100 % | WEIGHT: 180 LBS | SYSTOLIC BLOOD PRESSURE: 113 MMHG | BODY MASS INDEX: 26.66 KG/M2

## 2023-06-06 DIAGNOSIS — R07.89 CHEST TIGHTNESS: Primary | ICD-10-CM

## 2023-06-06 LAB
ALBUMIN SERPL-MCNC: 5.5 GM/DL (ref 3.4–5)
ALP BLD-CCNC: 108 IU/L (ref 40–129)
ALT SERPL-CCNC: 27 U/L (ref 10–40)
ANION GAP SERPL CALCULATED.3IONS-SCNC: 13 MMOL/L (ref 4–16)
AST SERPL-CCNC: 26 IU/L (ref 15–37)
BASOPHILS ABSOLUTE: 0.1 K/CU MM
BASOPHILS RELATIVE PERCENT: 1.3 % (ref 0–1)
BILIRUB SERPL-MCNC: 0.4 MG/DL (ref 0–1)
BUN SERPL-MCNC: 5 MG/DL (ref 6–23)
CALCIUM SERPL-MCNC: 10 MG/DL (ref 8.3–10.6)
CHLORIDE BLD-SCNC: 101 MMOL/L (ref 99–110)
CO2: 24 MMOL/L (ref 21–32)
CREAT SERPL-MCNC: 0.7 MG/DL (ref 0.9–1.3)
DIFFERENTIAL TYPE: ABNORMAL
EKG ATRIAL RATE: 96 BPM
EKG DIAGNOSIS: NORMAL
EKG P AXIS: 82 DEGREES
EKG P-R INTERVAL: 144 MS
EKG Q-T INTERVAL: 338 MS
EKG QRS DURATION: 80 MS
EKG QTC CALCULATION (BAZETT): 427 MS
EKG R AXIS: 87 DEGREES
EKG T AXIS: 75 DEGREES
EKG VENTRICULAR RATE: 96 BPM
EOSINOPHILS ABSOLUTE: 0.3 K/CU MM
EOSINOPHILS RELATIVE PERCENT: 3 % (ref 0–3)
GFR SERPL CREATININE-BSD FRML MDRD: >60 ML/MIN/1.73M2
GLUCOSE SERPL-MCNC: 68 MG/DL (ref 70–99)
HCT VFR BLD CALC: 48.5 % (ref 42–52)
HEMOGLOBIN: 15.7 GM/DL (ref 13.5–18)
IMMATURE NEUTROPHIL %: 0.2 % (ref 0–0.43)
LYMPHOCYTES ABSOLUTE: 2.3 K/CU MM
LYMPHOCYTES RELATIVE PERCENT: 28.4 % (ref 24–44)
MCH RBC QN AUTO: 31.5 PG (ref 27–31)
MCHC RBC AUTO-ENTMCNC: 32.4 % (ref 32–36)
MCV RBC AUTO: 97.4 FL (ref 78–100)
MONOCYTES ABSOLUTE: 0.7 K/CU MM
MONOCYTES RELATIVE PERCENT: 8.7 % (ref 0–4)
NUCLEATED RBC %: 0 %
PDW BLD-RTO: 11.9 % (ref 11.7–14.9)
PLATELET # BLD: 300 K/CU MM (ref 140–440)
PMV BLD AUTO: 10.6 FL (ref 7.5–11.1)
POTASSIUM SERPL-SCNC: 3.9 MMOL/L (ref 3.5–5.1)
RBC # BLD: 4.98 M/CU MM (ref 4.6–6.2)
SEGMENTED NEUTROPHILS ABSOLUTE COUNT: 4.8 K/CU MM
SEGMENTED NEUTROPHILS RELATIVE PERCENT: 58.4 % (ref 36–66)
SODIUM BLD-SCNC: 138 MMOL/L (ref 135–145)
TOTAL IMMATURE NEUTOROPHIL: 0.02 K/CU MM
TOTAL NUCLEATED RBC: 0 K/CU MM
TOTAL PROTEIN: 9 GM/DL (ref 6.4–8.2)
TROPONIN T: <0.01 NG/ML
WBC # BLD: 8.2 K/CU MM (ref 4–10.5)

## 2023-06-06 PROCEDURE — 6360000002 HC RX W HCPCS: Performed by: EMERGENCY MEDICINE

## 2023-06-06 PROCEDURE — 84484 ASSAY OF TROPONIN QUANT: CPT

## 2023-06-06 PROCEDURE — 96375 TX/PRO/DX INJ NEW DRUG ADDON: CPT

## 2023-06-06 PROCEDURE — 6370000000 HC RX 637 (ALT 250 FOR IP): Performed by: EMERGENCY MEDICINE

## 2023-06-06 PROCEDURE — 93005 ELECTROCARDIOGRAM TRACING: CPT | Performed by: EMERGENCY MEDICINE

## 2023-06-06 PROCEDURE — 93010 ELECTROCARDIOGRAM REPORT: CPT | Performed by: INTERNAL MEDICINE

## 2023-06-06 PROCEDURE — 71045 X-RAY EXAM CHEST 1 VIEW: CPT

## 2023-06-06 PROCEDURE — 80053 COMPREHEN METABOLIC PANEL: CPT

## 2023-06-06 PROCEDURE — 96374 THER/PROPH/DIAG INJ IV PUSH: CPT

## 2023-06-06 PROCEDURE — 85025 COMPLETE CBC W/AUTO DIFF WBC: CPT

## 2023-06-06 PROCEDURE — 99285 EMERGENCY DEPT VISIT HI MDM: CPT

## 2023-06-06 RX ORDER — METHOCARBAMOL 500 MG/1
500 TABLET, FILM COATED ORAL 4 TIMES DAILY
Qty: 40 TABLET | Refills: 0 | Status: SHIPPED | OUTPATIENT
Start: 2023-06-06 | End: 2023-06-16

## 2023-06-06 RX ORDER — DIPHENHYDRAMINE HYDROCHLORIDE 50 MG/ML
25 INJECTION INTRAMUSCULAR; INTRAVENOUS ONCE
Status: COMPLETED | OUTPATIENT
Start: 2023-06-06 | End: 2023-06-06

## 2023-06-06 RX ORDER — LIDOCAINE HYDROCHLORIDE 20 MG/ML
15 SOLUTION OROPHARYNGEAL ONCE
Status: COMPLETED | OUTPATIENT
Start: 2023-06-06 | End: 2023-06-06

## 2023-06-06 RX ORDER — MAGNESIUM HYDROXIDE/ALUMINUM HYDROXICE/SIMETHICONE 120; 1200; 1200 MG/30ML; MG/30ML; MG/30ML
30 SUSPENSION ORAL ONCE
Status: COMPLETED | OUTPATIENT
Start: 2023-06-06 | End: 2023-06-06

## 2023-06-06 RX ORDER — ONDANSETRON 2 MG/ML
4 INJECTION INTRAMUSCULAR; INTRAVENOUS ONCE
Status: COMPLETED | OUTPATIENT
Start: 2023-06-06 | End: 2023-06-06

## 2023-06-06 RX ADMIN — DIPHENHYDRAMINE HYDROCHLORIDE 25 MG: 50 INJECTION, SOLUTION INTRAMUSCULAR; INTRAVENOUS at 18:16

## 2023-06-06 RX ADMIN — ONDANSETRON 4 MG: 2 INJECTION INTRAMUSCULAR; INTRAVENOUS at 18:16

## 2023-06-06 RX ADMIN — LIDOCAINE HYDROCHLORIDE 15 ML: 20 SOLUTION ORAL at 18:06

## 2023-06-06 RX ADMIN — ALUMINUM HYDROXIDE, MAGNESIUM HYDROXIDE, AND SIMETHICONE 30 ML: 200; 200; 20 SUSPENSION ORAL at 18:06

## 2023-06-06 ASSESSMENT — PAIN SCALES - GENERAL: PAINLEVEL_OUTOF10: 0

## 2023-06-06 ASSESSMENT — PAIN - FUNCTIONAL ASSESSMENT: PAIN_FUNCTIONAL_ASSESSMENT: NONE - DENIES PAIN

## 2023-06-06 ASSESSMENT — PAIN DESCRIPTION - LOCATION: LOCATION: THROAT

## 2023-06-06 ASSESSMENT — HEART SCORE: ECG: 0

## 2023-06-06 NOTE — DISCHARGE INSTRUCTIONS
Please restart your PPI medication as prescribed as I believe this will help your symptoms. Please follow-up with the pulmonologist, Dr. Сергей Lockhart, for further testing and pulmonary function testing and the cardiologist, Dr. Althea Armas, for further heart testing.

## 2023-06-06 NOTE — ED PROVIDER NOTES
Triage Chief Complaint:   Chest Pain and Back Pain    Ute:  Kapil Pickett is a 26 y.o. male that presents with chest pain. Patient was in baseline state of health until the past several days with symptoms worsening over the weekend. Patient reports a chest tightness in the central chest but radiating to bilateral chest.  Symptoms are worse with twisting and movement as well as palpation. Patient's wife has been helping him with some massage which does seem to help with his symptoms. Some associated anxiety which patient reports might also be contributing to his symptoms. Patient does detail a history of eosinophilic esophagitis and follows with gastroenterology for this. Patient reports that however his pain today seems different than that. No known coronary disease. No smoking or illicit substances of abuse. No history of DVT or PE or prolonged immobilization or recent hospitalization.     ROS:  General:  No fevers, no chills, no weakness  Eyes:  No recent vison changes, no discharge  ENT:  No sore throat, no nasal congestion  Cardiovascular:  + chest pain, no palpitations  Respiratory:  No shortness of breath, no cough, no wheezing  Gastrointestinal:  No pain, no nausea, no vomiting, no diarrhea  Musculoskeletal:  + muscle pain, no joint pain  Skin:  No rash, no pruritis, no easy bruising  Neurologic:  No speech problems, no headache, no extremity numbness, no extremity tingling, no extremity weakness  Psychiatric:  No anxiety  Genitourinary:  No dysuria, no increased urinary frequency  Extremities:  no edema, no pain    Past Medical History:   Diagnosis Date    H. pylori infection      Past Surgical History:   Procedure Laterality Date    ESOPHAGOSCOPY N/A 09/29/2022    ESOPHAGOSCOPY BIOPSY performed by Kwaku Martínez MD at [place] [address] N/A 12/29/2022    EGD BIOPSY performed by Kwaku Martínez MD at [address] [place] [address]      Family History

## 2023-06-07 ENCOUNTER — CARE COORDINATION (OUTPATIENT)
Dept: OTHER | Facility: CLINIC | Age: 33
End: 2023-06-07

## 2023-06-07 NOTE — CARE COORDINATION
Ambulatory Care Coordination Note  2023    Patient Current Location:  Home: [address] [place] [phone]    ACM contacted the patient by telephone. Verified name and  with patient as identifiers. Provided introduction to self, and explanation of the ACM role. ACM: Parker Rand LPN    Challenges to be reviewed by the provider   Additional needs identified to be addressed with provider: No  none               Method of communication with provider: none. Spoke to patient, intro to ACM. He states today his chest pain and SOB have slightly improved, however, he has been struggling with these symptoms for a while. Shortly after BMs, he experiences a chest tightness and SOB. He describes this as feeling like his colon and GI accessory muscles are jeremy and feel extremely tight and painful. This has been causing him to eat very slowly. He has an Endoscopy and swallow study this year showing Eosinophilic Esophagitis. He sees [facility] GI [facility], Dr. Giovanni Younger. Pulmonologist is Dr. Lacie Charles with [facility]. Referred to cardiology- he agrees to call to schedule this today- Dr. Nathanael Lundberg with [facility]. Patient states he is very nervous to take medications, as he fears how they will correlate with his symptoms. ACM recommended adding a daily Probiotic. Patient states his recent medical issues have put a damper on his work, he is getting behind on bills. Wife works, he used to work part time at a very [employer], but is no longer able to work. Company is too small to offer short term disability. He is interested in SS disability. Agrees to have [address], out reach him to discuss this as well as local resources. Referral sent. Will f/u early next or sooner if unable to get a cardio appt in a timely manner. [facility]es All[facility] on .       Ambulatory Care Coordination Assessment    Care Coordination Protocol  Referral from Primary Care Provider:

## 2023-06-07 NOTE — CARE COORDINATION
MSW contacted patient for evaluation . Patient identifiers confirmed, zip code and . Referral sent to  by Nurse Reed Hernández LPN. Purpose of TC  MSW received referral reporting patient with questions regarding SS Disability; patient w/financial hardships. MSW outreach to patient to discuss. Employment  Per report, not currently employed due to medical reasons    Finances  Per report, spouse/wife full time [employer] action  Patient politely reports unable to discuss at this time. MSW to await return call; MSW to outreach again. ACM provided contact information for future needs.  Plan for follow-up call in 3-5 days

## 2023-06-19 ENCOUNTER — OFFICE VISIT (OUTPATIENT)
Dept: CARDIOLOGY CLINIC | Age: 33
End: 2023-06-19
Payer: COMMERCIAL

## 2023-06-19 ENCOUNTER — CARE COORDINATION (OUTPATIENT)
Dept: OTHER | Facility: CLINIC | Age: 33
End: 2023-06-19

## 2023-06-19 VITALS
SYSTOLIC BLOOD PRESSURE: 120 MMHG | OXYGEN SATURATION: 99 % | WEIGHT: 111 LBS | BODY MASS INDEX: 16.44 KG/M2 | DIASTOLIC BLOOD PRESSURE: 72 MMHG | HEART RATE: 89 BPM | HEIGHT: 69 IN

## 2023-06-19 DIAGNOSIS — K22.2 ESOPHAGEAL STRICTURE: ICD-10-CM

## 2023-06-19 DIAGNOSIS — R07.89 FEELING OF CHEST TIGHTNESS: ICD-10-CM

## 2023-06-19 DIAGNOSIS — K20.90 ESOPHAGITIS: ICD-10-CM

## 2023-06-19 PROCEDURE — G8428 CUR MEDS NOT DOCUMENT: HCPCS | Performed by: INTERNAL MEDICINE

## 2023-06-19 PROCEDURE — 99204 OFFICE O/P NEW MOD 45 MIN: CPT | Performed by: INTERNAL MEDICINE

## 2023-06-19 PROCEDURE — G8418 CALC BMI BLW LOW PARAM F/U: HCPCS | Performed by: INTERNAL MEDICINE

## 2023-06-19 RX ORDER — M-VIT,TX,IRON,MINS/CALC/FOLIC 27MG-0.4MG
1 TABLET ORAL DAILY
COMMUNITY

## 2023-06-19 RX ORDER — SUCRALFATE 1 G/1
1 TABLET ORAL 4 TIMES DAILY
Qty: 120 TABLET | Refills: 3 | Status: SHIPPED | OUTPATIENT
Start: 2023-06-19

## 2023-06-19 NOTE — ASSESSMENT & PLAN NOTE
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 Nevertheless for insurance purposes we will get treadmill stress test and also get an echo to evaluate LV function.   We can consider getting a calcium score or a cardiac CT depending on stress test results which would be helpful to look at his esophagus and chest  as well  In the meantime of asked him to increase Protonix to twice a day and use sucralfate 1 g 4 times daily to see if it would help with the symptoms for about a month  Due to weight loss check TSH check magnesium, phosphorous

## 2023-06-19 NOTE — PATIENT INSTRUCTIONS
**It is YOUR responsibilty to bring medication bottles and/or updated medication list to [address] [place] [address]. This will allow us to better serve you and all your healthcare needs**  [facility] Locations - No appointment necessary. Sites open Monday to Friday. Call your preferred location for test preparation, business   hours and other information you need. [employer] accepts [employer]. [address] John Camilo, [address] [facility]  Phone: [phone]     Thank you for allowing us to care for you today! We want to ensure we can follow your treatment plan and we strive to give you the best outcomes and experience possible. If you ever have a life threatening emergency and call 911 - for an ambulance (EMS)   Our providers can only care for you at:   [facility] or [facility]. Even if you have someone take you or you drive yourself we can only care for you in a [facility] Our providers are not setup at the other healthcare locations! Please be informed that if you contact our office outside of normal business hours the physician on call cannot help with any scheduling or rescheduling issues, procedure instruction questions or any type of medication issue. We advise you for any urgent/emergency that you go to the nearest emergency room!     PLEASE CALL OUR OFFICE DURING NORMAL BUSINESS HOURS    Monday - Friday   8 am to 5 pm    [address]: [phone]    [place]:  [phone]

## 2023-06-19 NOTE — CARE COORDINATION
Ambulatory Care Coordination Note    ACM attempted to reach patient for follow up call regarding follow up post cardiology appt. HIPAA compliant message left requesting a return phone call at patient convenience. Orders Placed    LIPID PANEL  Magnesium  Phosphorus  CARDIAC STRESS TEST EXERCISE ONLY  ECHO Complete 2D W Doppler W Color        Michelle MARTINEZ Wentworth Maren, [address] [place] [address] Coordinator  Associate Care Management  [address] [place] [address]  Phone: [phone]  [email or web address]

## 2023-06-21 ENCOUNTER — HOSPITAL ENCOUNTER (OUTPATIENT)
Age: 33
Discharge: HOME | End: 2023-06-21
Payer: COMMERCIAL

## 2023-06-21 ENCOUNTER — CARE COORDINATION (OUTPATIENT)
Dept: OTHER | Facility: CLINIC | Age: 33
End: 2023-06-21

## 2023-06-21 DIAGNOSIS — K20.90 ESOPHAGITIS: ICD-10-CM

## 2023-06-21 DIAGNOSIS — R07.89 FEELING OF CHEST TIGHTNESS: ICD-10-CM

## 2023-06-21 DIAGNOSIS — K22.2 ESOPHAGEAL STRICTURE: ICD-10-CM

## 2023-06-21 LAB
CHOLEST SERPL-MCNC: 135 MG/DL
HDLC SERPL-MCNC: 52 MG/DL
LDLC SERPL CALC-MCNC: 73 MG/DL
MAGNESIUM: 2.2 MG/DL (ref 1.8–2.4)
PHOSPHORUS: 4 MG/DL (ref 2.5–4.9)
TRIGL SERPL-MCNC: 51 MG/DL

## 2023-06-21 PROCEDURE — 80061 LIPID PANEL: CPT

## 2023-06-21 PROCEDURE — 84100 ASSAY OF PHOSPHORUS: CPT

## 2023-06-21 PROCEDURE — 36415 COLL VENOUS BLD VENIPUNCTURE: CPT

## 2023-06-21 PROCEDURE — 83735 ASSAY OF MAGNESIUM: CPT

## 2023-06-22 ENCOUNTER — PROCEDURE VISIT (OUTPATIENT)
Dept: CARDIOLOGY CLINIC | Age: 33
End: 2023-06-22

## 2023-06-22 VITALS
HEART RATE: 104 BPM | HEIGHT: 69 IN | DIASTOLIC BLOOD PRESSURE: 62 MMHG | BODY MASS INDEX: 16.44 KG/M2 | SYSTOLIC BLOOD PRESSURE: 118 MMHG | WEIGHT: 111 LBS

## 2023-06-22 DIAGNOSIS — R07.89 FEELING OF CHEST TIGHTNESS: Primary | ICD-10-CM

## 2023-06-22 DIAGNOSIS — K20.90 ESOPHAGITIS: ICD-10-CM

## 2023-06-22 DIAGNOSIS — K22.2 ESOPHAGEAL STRICTURE: ICD-10-CM

## 2023-06-22 DIAGNOSIS — R06.02 SOB (SHORTNESS OF BREATH): ICD-10-CM

## 2023-06-22 DIAGNOSIS — R07.89 FEELING OF CHEST TIGHTNESS: ICD-10-CM

## 2023-06-22 LAB
LV EF: 58 %
LVEF MODALITY: NORMAL

## 2023-06-26 ENCOUNTER — CARE COORDINATION (OUTPATIENT)
Dept: OTHER | Facility: CLINIC | Age: 33
End: 2023-06-26

## 2023-06-27 ENCOUNTER — TELEPHONE (OUTPATIENT)
Dept: CARDIOLOGY CLINIC | Age: 33
End: 2023-06-27

## 2023-07-13 ENCOUNTER — HOSPITAL ENCOUNTER (EMERGENCY)
Age: 33
Discharge: HOME | End: 2023-07-14
Attending: EMERGENCY MEDICINE
Payer: COMMERCIAL

## 2023-07-13 ENCOUNTER — TELEPHONE (OUTPATIENT)
Dept: FAMILY MEDICINE CLINIC | Age: 33
End: 2023-07-13

## 2023-07-13 ENCOUNTER — APPOINTMENT (OUTPATIENT)
Dept: GENERAL RADIOLOGY | Age: 33
End: 2023-07-13
Payer: COMMERCIAL

## 2023-07-13 DIAGNOSIS — Z83.79 FAMILY HISTORY OF EOSINOPHILIC ESOPHAGITIS: ICD-10-CM

## 2023-07-13 DIAGNOSIS — R25.1 TREMOR: Primary | ICD-10-CM

## 2023-07-13 DIAGNOSIS — F48.9 TENSION: ICD-10-CM

## 2023-07-13 LAB
ALBUMIN SERPL-MCNC: 5.6 GM/DL (ref 3.4–5)
ALP BLD-CCNC: 99 IU/L (ref 40–129)
ALT SERPL-CCNC: 23 U/L (ref 10–40)
ANION GAP SERPL CALCULATED.3IONS-SCNC: 14 MMOL/L (ref 4–16)
AST SERPL-CCNC: 22 IU/L (ref 15–37)
BASOPHILS ABSOLUTE: 0.1 K/CU MM
BASOPHILS RELATIVE PERCENT: 1.1 % (ref 0–1)
BILIRUB SERPL-MCNC: 0.7 MG/DL (ref 0–1)
BILIRUBIN URINE: NEGATIVE MG/DL
BLOOD, URINE: NEGATIVE
BUN SERPL-MCNC: 7 MG/DL (ref 6–23)
CALCIUM SERPL-MCNC: 10.5 MG/DL (ref 8.3–10.6)
CHLORIDE BLD-SCNC: 99 MMOL/L (ref 99–110)
CLARITY: CLEAR
CO2: 23 MMOL/L (ref 21–32)
COLOR: YELLOW
COMMENT UA: NORMAL
CREAT SERPL-MCNC: 0.7 MG/DL (ref 0.9–1.3)
DIFFERENTIAL TYPE: ABNORMAL
EOSINOPHILS ABSOLUTE: 0.2 K/CU MM
EOSINOPHILS RELATIVE PERCENT: 2.7 % (ref 0–3)
GFR SERPL CREATININE-BSD FRML MDRD: >60 ML/MIN/1.73M2
GLUCOSE SERPL-MCNC: 82 MG/DL (ref 70–99)
GLUCOSE, URINE: NEGATIVE MG/DL
HCT VFR BLD CALC: 45.8 % (ref 42–52)
HEMOGLOBIN: 14.9 GM/DL (ref 13.5–18)
IMMATURE NEUTROPHIL %: 0.2 % (ref 0–0.43)
KETONES, URINE: NEGATIVE MG/DL
LEUKOCYTE ESTERASE, URINE: NEGATIVE
LIPASE: 48 IU/L (ref 13–60)
LYMPHOCYTES ABSOLUTE: 2.9 K/CU MM
LYMPHOCYTES RELATIVE PERCENT: 33.8 % (ref 24–44)
MCH RBC QN AUTO: 31.7 PG (ref 27–31)
MCHC RBC AUTO-ENTMCNC: 32.5 % (ref 32–36)
MCV RBC AUTO: 97.4 FL (ref 78–100)
MONOCYTES ABSOLUTE: 0.7 K/CU MM
MONOCYTES RELATIVE PERCENT: 8.1 % (ref 0–4)
NITRITE URINE, QUANTITATIVE: NEGATIVE
NUCLEATED RBC %: 0 %
PDW BLD-RTO: 11.5 % (ref 11.7–14.9)
PH, URINE: 7 (ref 5–8)
PLATELET # BLD: 291 K/CU MM (ref 140–440)
PMV BLD AUTO: 10.8 FL (ref 7.5–11.1)
POTASSIUM SERPL-SCNC: 4.2 MMOL/L (ref 3.5–5.1)
PROTEIN UA: NEGATIVE MG/DL
RBC # BLD: 4.7 M/CU MM (ref 4.6–6.2)
SEGMENTED NEUTROPHILS ABSOLUTE COUNT: 4.6 K/CU MM
SEGMENTED NEUTROPHILS RELATIVE PERCENT: 54.1 % (ref 36–66)
SODIUM BLD-SCNC: 136 MMOL/L (ref 135–145)
SPECIFIC GRAVITY UA: 1.01 (ref 1–1.03)
TOTAL CK: 77 IU/L (ref 38–174)
TOTAL CK: 80 IU/L (ref 38–174)
TOTAL IMMATURE NEUTOROPHIL: 0.02 K/CU MM
TOTAL NUCLEATED RBC: 0 K/CU MM
TOTAL PROTEIN: 8.3 GM/DL (ref 6.4–8.2)
TROPONIN T: <0.01 NG/ML
UROBILINOGEN, URINE: 0.2 MG/DL (ref 0.2–1)
WBC # BLD: 8.6 K/CU MM (ref 4–10.5)

## 2023-07-13 PROCEDURE — 71045 X-RAY EXAM CHEST 1 VIEW: CPT

## 2023-07-13 PROCEDURE — 99285 EMERGENCY DEPT VISIT HI MDM: CPT

## 2023-07-13 PROCEDURE — 81003 URINALYSIS AUTO W/O SCOPE: CPT

## 2023-07-13 PROCEDURE — 84484 ASSAY OF TROPONIN QUANT: CPT

## 2023-07-13 PROCEDURE — 83690 ASSAY OF LIPASE: CPT

## 2023-07-13 PROCEDURE — 93005 ELECTROCARDIOGRAM TRACING: CPT | Performed by: PHYSICIAN ASSISTANT

## 2023-07-13 PROCEDURE — 96374 THER/PROPH/DIAG INJ IV PUSH: CPT

## 2023-07-13 PROCEDURE — 6360000002 HC RX W HCPCS: Performed by: PHYSICIAN ASSISTANT

## 2023-07-13 PROCEDURE — 2580000003 HC RX 258: Performed by: PHYSICIAN ASSISTANT

## 2023-07-13 PROCEDURE — 82550 ASSAY OF CK (CPK): CPT

## 2023-07-13 PROCEDURE — 85025 COMPLETE CBC W/AUTO DIFF WBC: CPT

## 2023-07-13 PROCEDURE — 80053 COMPREHEN METABOLIC PANEL: CPT

## 2023-07-13 PROCEDURE — 83735 ASSAY OF MAGNESIUM: CPT

## 2023-07-13 RX ORDER — KETOROLAC TROMETHAMINE 15 MG/ML
15 INJECTION, SOLUTION INTRAMUSCULAR; INTRAVENOUS ONCE
Status: COMPLETED | OUTPATIENT
Start: 2023-07-13 | End: 2023-07-13

## 2023-07-13 RX ORDER — 0.9 % SODIUM CHLORIDE 0.9 %
1000 INTRAVENOUS SOLUTION INTRAVENOUS ONCE
Status: COMPLETED | OUTPATIENT
Start: 2023-07-13 | End: 2023-07-14

## 2023-07-13 RX ADMIN — SODIUM CHLORIDE 1000 ML: 9 INJECTION, SOLUTION INTRAVENOUS at 23:04

## 2023-07-13 RX ADMIN — KETOROLAC TROMETHAMINE 15 MG: 15 INJECTION, SOLUTION INTRAMUSCULAR; INTRAVENOUS at 23:04

## 2023-07-14 ENCOUNTER — CARE COORDINATION (OUTPATIENT)
Dept: OTHER | Facility: CLINIC | Age: 33
End: 2023-07-14

## 2023-07-14 VITALS
RESPIRATION RATE: 16 BRPM | HEIGHT: 69 IN | TEMPERATURE: 97.7 F | BODY MASS INDEX: 16 KG/M2 | WEIGHT: 108 LBS | SYSTOLIC BLOOD PRESSURE: 112 MMHG | OXYGEN SATURATION: 100 % | HEART RATE: 80 BPM | DIASTOLIC BLOOD PRESSURE: 75 MMHG

## 2023-07-14 LAB
EKG ATRIAL RATE: 85 BPM
EKG DIAGNOSIS: NORMAL
EKG P AXIS: 80 DEGREES
EKG P-R INTERVAL: 138 MS
EKG Q-T INTERVAL: 368 MS
EKG QRS DURATION: 88 MS
EKG QTC CALCULATION (BAZETT): 437 MS
EKG R AXIS: 88 DEGREES
EKG T AXIS: 68 DEGREES
EKG VENTRICULAR RATE: 85 BPM
MAGNESIUM: 2.1 MG/DL (ref 1.8–2.4)

## 2023-07-14 PROCEDURE — 93010 ELECTROCARDIOGRAM REPORT: CPT | Performed by: INTERNAL MEDICINE

## 2023-07-14 NOTE — ED PROVIDER NOTES
Triage Chief Complaint:   Fatigue, Abdominal Pain, and Other (Patient has EOE and is unable to swallow food)    [place]:  Today in the ED I had the pleasure of caring for Denia Andrade who is a 26 y.o. male that presents to the ED for inability to tolerate PO. He states he has had progressive stiffening and tightenning of his throat/chest. He states yesaterday he began feeling stiffness in his heart and was experiencing tingling in the hands and weakness with some associated tremmoring. He states 2 days ago ems was called and his hr went from the 40s to the 120s. He saw ems but opted to stay home. Then yesterday he had the same sx. Stiffness, tighness and tingling in his legs and hands. He states after having a bowel movement he experiences stiffness and tingling in his abdomen. Today he was able to eat breakfast, however he was unable to get any food down.  He .    ROS:  REVIEW OF SYSTEMS    At least 10 systems reviewed      All other review of systems are negative  See HPI and nursing notes for additional information       Past Medical History:   Diagnosis Date    H. pylori infection      Past Surgical History:   Procedure Laterality Date    ESOPHAGOSCOPY N/A 09/29/2022    ESOPHAGOSCOPY BIOPSY performed by Cristiane Bravo MD at [address] [place] [address] N/A 12/29/2022    EGD BIOPSY performed by Cristiane Bravo MD at [facility]      Family History   Problem Relation Age of Onset    Hypertension Father     Asthma Maternal Grandmother      Social History     Socioeconomic History    Marital status:      Spouse name: Not on file    Number of children: Not on file    Years of education: Not on file    Highest education level: Not on file   Occupational History    Not on file   Tobacco Use    Smoking status: Never    Smokeless tobacco: Never   Vaping Use    Vaping Use: Never used   Substance and Sexual Activity    Alcohol use: Never    Drug use: Never

## 2023-07-14 NOTE — CARE COORDINATION
Ambulatory Care Coordination Note  2023    Patient Current Location:  Home: [address] [place] [address]  [address] [facility] [address]     ACM contacted the patient by telephone. Verified name and  with patient as identifiers. Provided introduction to self, and explanation of the ACM role. Challenges to be reviewed by the provider   Additional needs identified to be addressed with provider: No  none               Method of communication with provider: none. ACM: Gaurav Mcgee LPN    Spoke to patient following his ER visit. He suddenly felt extreme abd discomfort, which led to tremors and lethargy in his extremities. Stiff hands and legs. This AM, he still feels abd cramping and weakness, but is moderately better. His cardio work up was WNL. Monitoring pulse. He was referred to a Neurologist Dr. Marylou Acharya- he has the # to call. He will let me know if appt is too far out. He also plans to call his GI MD today to schedule a f/u. ACM discussed the following RED FLAGS and encouraged patient to contact 911 for life threatening emergencies and PCP office  for non life-threatening symptoms. ACM also encouraged outreach to Nurse Access Line and/or ACM for assessment and intervention guidance as needed. Reminded patient of alternatives to ED such virtual visits, e-visits, [facility], urgent care, and walk in clinics as available. Care Coordination Interventions    Referral from Primary Care Provider: No  Suggested Interventions and Community Resources  Cardiac Rehab: In Process (Comment: Dr. Juan Francisco Mckeon )  Disease Specific Clinic: Completed (Comment: Sybil Richards- Dr. Keith De Los Santos)  Pulmonary Rehab: In Process (Comment: Remington Lebron)  Registered Dietician:  In Process (Comment: Needs RD)  Social Work: In Process  Zone Management Tools: Completed          Goals Addressed                   This Visit's Progress     Conditions and Symptoms   On

## 2023-07-14 NOTE — ED PROVIDER NOTES
12 lead EKG per my interpretation:  Normal Sinus Rhythm at 85  Axis is normal  QTc is  within an acceptable range  There is no specific T wave changes appreciated. There is no specific ST wave changes appreciated. No STEMI    Prior EKG to compare with was available and no clinically significant change in overall morphology compared to prior from 6/6/2023.     MD Sami Rice MD  07/13/23 0020

## 2023-07-18 ENCOUNTER — CARE COORDINATION (OUTPATIENT)
Dept: OTHER | Facility: CLINIC | Age: 33
End: 2023-07-18

## 2023-07-18 NOTE — CARE COORDINATION
23 Care Coordination  Note    Care Coordination  (CCSS), Karolina Ortega, received referral from Leatha HERNANDEZ LPN,  requesting Assistance with finding providers. yes - Need assistance with finding a sooner appointment with an in-network neuro provider. CCSS contacted Provider office, [facility]  via phone, verified name and  with Katie Blake as identifiers. Summary Note:   CCSS was tranferred to Dr. Harrison Re office directly. The office was closed. CCSS called the patient to get availability. Left a vm. CCSS received an incoming call from the patient who provided his name and  as identifiers. Availability:     Prefers telehealth - anytime       If in office:    Between 11 a.m. to 12 p.m. After 3:30 p.m. to 5:00 p.m. Feels worse between 8 a.m. - 1 p.m.        Plan:  [address] [place] [address] for follow-up call in 1-2 days

## 2023-07-19 ENCOUNTER — CARE COORDINATION (OUTPATIENT)
Dept: OTHER | Facility: CLINIC | Age: 33
End: 2023-07-19

## 2023-07-19 DIAGNOSIS — K20.0 EOSINOPHILIC ESOPHAGITIS: Primary | ICD-10-CM

## 2023-07-19 NOTE — CARE COORDINATION
Ambulatory Care Coordination Note  2023    Patient Current Location:  Home: [address] [place] [address]  [address] [facility] [address]     ACM contacted the patient by telephone. Verified name and  with patient as identifiers. Provided introduction to self, and explanation of the ACM role. Challenges to be reviewed by the provider   Additional needs identified to be addressed with provider: Yes  Appt               Method of communication with provider: phone. ACM: Daya Beltran LPN    Spoke to patient. He continues to state that [place] day is a challenge. \" Struggles daily to get up and perform ADLs, dealing with the ongoing abd pains, weakness. Advised him that the GI referral to Jodie Em was placed, the office should call him in a few days to schedule. Acm will follow up on this if they have not contacted him by Friday. Patient is also interested in seeing an infectious disease provider. He states in addition to his s/s, he will also experience increases in body temperature, will go from 97.5 to 100.0 throughout the day. Not daily, intermittent. Continues to have loose, floating stools, malodorous. States he is s/s are beginning to wear on his mental health, wants to feel good again. His mother recently moved from [place] to live with he and his wife for support. He states she has been such a good support for him and has helped his loneliness. Wife continues to be great support as well, she works daily during the week. Acm contacted PCP office to schedule- PCP would like to see patient in person to discuss multiple medical issues. Advised patient of this- he states he understands and he will attend tomorrows appt. Acm to f/u after appt. Care Coordination Interventions    Referral from Primary Care Provider: No  Suggested Interventions and Community Resources  Cardiac Rehab:  In Process (Comment: Dr. Alek Pearce)  Disease Specific Clinic: Completed

## 2023-07-19 NOTE — CARE COORDINATION
23 Care Coordination  Note    Care Coordination  (CCSS), Henry Reyes, received referral from AC, Tiffany Butler LPN, requesting Assistance with appointment scheduling or follow-up. yes - Need assistance with finding a sooner appointment with an in-network neuro provide . CCSS contacted Provider office, [facility])  via phone, verified name and  with Kevin Christian as identifiers. Summary Note:   CCSS was transferred to the [place] office. Per Kevin Christian, need to contact the [place] office at [phone]. CCSS contacted [facility] neurology. Left a vm. CCSS received an incoming call from [place]. The patient currently has the most recent appointment. He will be added to the cancellation list.  Unfortunately, they don't do telehealth calls. CCSS contacted the patient to provide update. Pt had another episode yesterday. Temp goes up 99.3 - 99.4. He left message with AC. CCSS provided encouragement. Will continue looking for a sooner appointment. If OON, will get an OON Exception Waiver       Plan:  Notified ACM. Still looking for sooner appt.   CCSS Plan for follow-up call in 1-2 days

## 2023-07-20 ENCOUNTER — OFFICE VISIT (OUTPATIENT)
Dept: FAMILY MEDICINE CLINIC | Age: 33
End: 2023-07-20
Payer: COMMERCIAL

## 2023-07-20 ENCOUNTER — CARE COORDINATION (OUTPATIENT)
Dept: OTHER | Facility: CLINIC | Age: 33
End: 2023-07-20

## 2023-07-20 VITALS
SYSTOLIC BLOOD PRESSURE: 104 MMHG | HEIGHT: 69 IN | BODY MASS INDEX: 15.67 KG/M2 | WEIGHT: 105.8 LBS | OXYGEN SATURATION: 99 % | TEMPERATURE: 98.4 F | HEART RATE: 104 BPM | DIASTOLIC BLOOD PRESSURE: 62 MMHG

## 2023-07-20 DIAGNOSIS — R10.9 ABDOMINAL CRAMPING: Primary | ICD-10-CM

## 2023-07-20 DIAGNOSIS — R63.4 WEIGHT LOSS: ICD-10-CM

## 2023-07-20 DIAGNOSIS — R63.6 UNDERWEIGHT: ICD-10-CM

## 2023-07-20 LAB
BILIRUBIN, POC: NORMAL
BLOOD URINE, POC: NORMAL
CLARITY, POC: CLEAR
COLOR, POC: NORMAL
GLUCOSE URINE, POC: NORMAL
KETONES, POC: NORMAL
LEUKOCYTE EST, POC: NORMAL
NITRITE, POC: NORMAL
PH, POC: 6.5
PROTEIN, POC: NORMAL
SPECIFIC GRAVITY, POC: 1.01
UROBILINOGEN, POC: NORMAL

## 2023-07-20 PROCEDURE — 1036F TOBACCO NON-USER: CPT | Performed by: FAMILY MEDICINE

## 2023-07-20 PROCEDURE — G8427 DOCREV CUR MEDS BY ELIG CLIN: HCPCS | Performed by: FAMILY MEDICINE

## 2023-07-20 PROCEDURE — 81002 URINALYSIS NONAUTO W/O SCOPE: CPT | Performed by: FAMILY MEDICINE

## 2023-07-20 PROCEDURE — G8418 CALC BMI BLW LOW PARAM F/U: HCPCS | Performed by: FAMILY MEDICINE

## 2023-07-20 PROCEDURE — 99214 OFFICE O/P EST MOD 30 MIN: CPT | Performed by: FAMILY MEDICINE

## 2023-07-20 RX ORDER — CYPROHEPTADINE HYDROCHLORIDE 4 MG/1
4 TABLET ORAL 3 TIMES DAILY
Qty: 90 TABLET | Refills: 0 | Status: SHIPPED | OUTPATIENT
Start: 2023-07-20

## 2023-07-20 RX ORDER — MULTIVIT-MIN/IRON/FOLIC ACID/K 18-600-40
CAPSULE ORAL
COMMUNITY

## 2023-07-20 ASSESSMENT — ENCOUNTER SYMPTOMS: ABDOMINAL PAIN: 1

## 2023-07-20 NOTE — CARE COORDINATION
23 Care Coordination  Note    Care Coordination  (CCSS), Socorro Kenny, received referral from [facility], Oswald Bettencourt, RN,  requesting Assistance with appointment scheduling or follow-up. yes - Need assistance with finding a sooner appointment with an in-network neuro provider . CCSS contacted Provider office, Paola Hawkins,   via phone, verified name and  with Naomi Hooper as identifiers. Summary Note:   Naomi Hooper stated that she was new. They are booking -. Fax referral to [phone]. Peggy Britt was placed on the call. They are accepting new patients and they do offer telehealth appointments but not for the first visit. [address] [place] [address] location is booking further out.  Is there every other week. [address] [facility], [address] [facility]     Phone [phone]  contacted Balta Kennedy the office of Dr Diego Garrett. Talked to Jacquelyne Dakins..  They see peds and adults. No referral is needed. Have the patient call back at 8:30 a.m. on  as they are booking new patients at that time. Call at 8:30 a.m.. Other patients will be calling. [address] [address] [address] [phone]   CCSS provided the patient with the info above. Advised that CCSS will take care of getting the referral faxed to Dr. Kris Santiago's office. We'll see which appointment gets scheduled the soonest.  He was very appreciative. Plan:  Chart routed to [facility] for review.    CCSS Plan for follow-up call in 1-2 days

## 2023-07-20 NOTE — PATIENT INSTRUCTIONS
DON'T FORGET TO VOTE ON TUESDAY AUGUST EIGHTH      BRING YOUR MEDICATION BOTTLES TO ALL APPOINTMENTS    Check MY CHART for test results. If you have forgotten your password, call [phone]. The diagnoses and medications listed in this after visit summary may not be up to date. Check MY CHART in 28-48 hours for corrections. Late cancellation policy: So that we can better accommodate people who are sick, please give our office 24 hour notice for an appointment cancellation. Missed appointments: Your care is very important to us. It is important that you keep your scheduled appointments. Multiple missed appointments will lead to a dismissal from the office. Patients arriving late will be worked into the schedule as time permits, with patients arriving on time taken as scheduled. Late arriving patients are more than welcome to wait or reschedule their appointments. Please allow 5-7 business days for paperwork to be processed.

## 2023-07-20 NOTE — PROGRESS NOTES
enlargement  Borderline ECG  When compared with ECG of 06-JUN-2023 14:25,  No significant change was found  Confirmed by Momo Cates ([id number]) on 7/14/2023 6:02:33 PM      Total CK 07/13/2023 77     Magnesium 07/13/2023 2.1    Procedure visit on 06/22/2023   Component Date Value    Left Ventricular Ejectio* 06/22/2023 58     LVEF MODALITY 06/22/2023 ECHO    Hospital Outpatient Visit on 06/21/2023   Component Date Value    Triglycerides 06/21/2023 51     Cholesterol 06/21/2023 135     HDL 06/21/2023 52     LDL Calculated 06/21/2023 73     Magnesium 06/21/2023 2.2     Phosphorus 06/21/2023 4.0    Admission on 06/06/2023, Discharged on 06/06/2023   Component Date Value    Ventricular Rate 06/06/2023 96     Atrial Rate 06/06/2023 96     P-R Interval 06/06/2023 144     QRS Duration 06/06/2023 80     Q-T Interval 06/06/2023 338     QTc Calculation (Bazett) 06/06/2023 427     P Axis 06/06/2023 82     R [place] 06/06/2023 87     T Wellford 06/06/2023 75     Diagnosis 06/06/2023                      Value:Normal sinus rhythm with sinus arrhythmia  Normal ECG  No previous ECGs available  Confirmed by Momo Cates ([id number]) on 6/6/2023 7:00:46 PM      WBC 06/06/2023 8.2     RBC 06/06/2023 4.98     Hemoglobin 06/06/2023 15.7     Hematocrit 06/06/2023 48.5     MCV 06/06/2023 97.4     MCH 06/06/2023 31.5 (H)     MCHC 06/06/2023 32.4     RDW 06/06/2023 11.9     Platelets 73/16/0004 300     MPV 06/06/2023 10.6     Differential Type 06/06/2023 AUTOMATED DIFFERENTIAL     Segs Relative 06/06/2023 58.4     Lymphocytes % 06/06/2023 28.4     Monocytes % 06/06/2023 8.7 (H)     Eosinophils % 06/06/2023 3.0     Basophils % 06/06/2023 1.3 (H)     Segs Absolute 06/06/2023 4.8     Lymphocytes Absolute 06/06/2023 2.3     Monocytes Absolute 06/06/2023 0.7     Eosinophils Absolute 06/06/2023 0.3     Basophils Absolute 06/06/2023 0.1     Nucleated RBC % 06/06/2023 0.0     Total Nucleated RBC 06/06/2023 0.0     Total Immature Neutrophil 06/06/2023 0.02

## 2023-07-21 ENCOUNTER — CARE COORDINATION (OUTPATIENT)
Dept: OTHER | Facility: CLINIC | Age: 33
End: 2023-07-21

## 2023-07-21 NOTE — CARE COORDINATION
23 Care Coordination  Note    Care Coordination  (CCSS), Norma Hamm, received referral from [facility], Aydee Soares RN,  requesting Assistance with finding providers. yes - Need to advise PCP that a referral is needed in hopes of getting patient a sooner appointment      CCSS contacted Provider office, Dr. Sol Jane  via phone, verified name and  with [facility] as identifiers. Summary Note:   The docotor will be out of the office until the .  Unable to have NP assist with sending neurology referral as the doctor has to approve it. CCSS contacted the office of Saumya Silva PA-C for assistance with sending the neurology referral to Dr. Lynn Silvestre office. Left a .        Plan:  Plan for follow-up call in 1-2 days

## 2023-07-21 NOTE — CARE COORDINATION
MSW Documentation    MSW contacted patient for follow up. Patient identifiers confirmed, zip code and . Patient referred by Nurse Yamila Murrell LPN. Care Coordination Episodes    Type: Amb Care Management  Episode: Rising Risk  Noted: 2023  Comments: Associate GEMMA     Purpose of TC  MSW outreach to patient to follow up on previous discussions, resources sent, and to assess for further MSW needs. At last contact patient polite decline to outreach/apply for assistance reporting decision to await more medical testing for answers, and a personal desire to reportedly remain as \"self-sufficient\" for \"as long as possible\". MSW outreach to follow up. TC Details  Patient shared having had a hard week and a half due to medical issues. Reported has enough food to eat but with a lot of difficulty eating due to gastro symptoms. Patient to begin taking medication to help encourage appetite and supplemental drinks to assist with weight gain. Patient stated upcoming appointments with neuro, gastro and immunology/allergy. Patient ranked their well-being today as a 7/10, stating today is a rarer good day and they hope it remains so after the week and a half they have had struggling with symptoms-active listening and support provided. Patient shared polite continued decline on applying for SS disability due to awaiting results identifying cause: patient w/o dx for symptoms at this time. Patient reports they are managing financially and at this time not applying for assistance-encouraged to apply as needed. Patient with no further MSW needs at this time, encouraged to outreach this worker as/if needed and verbalized agreement with that plan. Plan of action  MSW will sign off. MSW remains available to assist as needed, please re-refer as needed. MSW provided contact information for future needs.  No further follow-up call indicated

## 2023-07-25 ENCOUNTER — CARE COORDINATION (OUTPATIENT)
Dept: OTHER | Facility: CLINIC | Age: 33
End: 2023-07-25

## 2023-07-25 SDOH — ECONOMIC STABILITY: TRANSPORTATION INSECURITY
IN THE PAST 12 MONTHS, HAS LACK OF TRANSPORTATION KEPT YOU FROM MEETINGS, WORK, OR FROM GETTING THINGS NEEDED FOR DAILY LIVING?: YES

## 2023-07-25 SDOH — HEALTH STABILITY: PHYSICAL HEALTH: ON AVERAGE, HOW MANY DAYS PER WEEK DO YOU ENGAGE IN MODERATE TO STRENUOUS EXERCISE (LIKE A BRISK WALK)?: 0 DAYS

## 2023-07-25 SDOH — HEALTH STABILITY: PHYSICAL HEALTH: ON AVERAGE, HOW MANY MINUTES DO YOU ENGAGE IN EXERCISE AT THIS LEVEL?: 0 MIN

## 2023-07-25 SDOH — ECONOMIC STABILITY: HOUSING INSECURITY
IN THE LAST 12 MONTHS, WAS THERE A TIME WHEN YOU DID NOT HAVE A STEADY PLACE TO SLEEP OR SLEPT IN A SHELTER (INCLUDING NOW)?: NO

## 2023-07-25 SDOH — ECONOMIC STABILITY: TRANSPORTATION INSECURITY
IN THE PAST 12 MONTHS, HAS THE LACK OF TRANSPORTATION KEPT YOU FROM MEDICAL APPOINTMENTS OR FROM GETTING MEDICATIONS?: YES

## 2023-07-25 SDOH — ECONOMIC STABILITY: INCOME INSECURITY: IN THE LAST 12 MONTHS, WAS THERE A TIME WHEN YOU WERE NOT ABLE TO PAY THE MORTGAGE OR RENT ON TIME?: NO

## 2023-07-25 NOTE — CARE COORDINATION
Ambulatory Care Coordination Note  2023    Patient Current Location:  Home: [address] [facility] [address]  [address] [facility] [address]     ACM contacted the patient by telephone. Verified name and  with patient as identifiers. Provided introduction to self, and explanation of the ACM role. Challenges to be reviewed by the provider   Additional needs identified to be addressed with provider: Yes  GI appt               Method of communication with provider: phone. ACM: [place] SHANIQUE Zavala    Spoke to [facility] to ensure they received the faxed referral from last week. They do have it but they require the patient themselves to call to schedule. ACM spoke to patient- he agrees to call to schedule today, # provided to [address]. He states over the weekend, he felt a little bit better, but the last 1-2 days, have been rough. Continues to see his weight drop. Unable to tolerate protein shakes, can't swallow. C/o stiff body with attempts to eat. He has not tried the Cyproheptadine prescribed by PCP as he does have an appetite but cannot tolerate most. He sees new allergist . He will let me know when he gets scheduled with GI. Care Coordination Interventions    Referral from Primary Care Provider: No  Suggested Interventions and Community Resources  Cardiac Rehab: In Process (Comment: Dr. Shey Cobb )  Disease Specific Clinic: Completed (Comment: [address]- Dr. Silvana Villarreal)  Pulmonary Rehab: In Process (Comment: Nilsa Givens)  Registered Dietician: In Process (Comment: Needs RD)  Social Work: Completed  Zone Management Tools: Completed          Goals Addressed                   This Visit's Progress     Conditions and Symptoms   On track     I will schedule office visits, as directed by my provider. I will notify my provider of any barriers to my plan of care. I will follow my Zone Management tool to seek urgent or emergent care.   I will notify my provider of

## 2023-07-31 ENCOUNTER — TELEPHONE (OUTPATIENT)
Dept: FAMILY MEDICINE CLINIC | Age: 33
End: 2023-07-31

## 2023-07-31 ENCOUNTER — CARE COORDINATION (OUTPATIENT)
Dept: OTHER | Facility: CLINIC | Age: 33
End: 2023-07-31

## 2023-07-31 NOTE — TELEPHONE ENCOUNTER
Patient care manager called   He has a referral for Neurology but cant get in till Oct and doesn't want to wait that long.    Wants a referral if possible to   [facility]   Ph [phone]  Fax [phone]

## 2023-07-31 NOTE — CARE COORDINATION
23 Care Coordination  Note    Care Coordination  (CCSS) Henry Reyes, received referral from [facility], Tiffany Butler LPN,  requesting Assistance with finding providers. yes - Need to a sooner appointment with an in-network neurologist .   Kadeem Howard contacted Provider office, Dr. Ez Ahn,   via phone, verified name and  with [facility] as identifiers. Summary Note:   [facility] stated that the doctor is back in the office today. CCSS advised that the patient would like to be seen by neurology sooner. Currently scheduled to see Dr. Osei Dooley on 10/6. CCSS advised that two providers are booking sooner. One of which needs a referral.  Provided info below. [facility] will call back with which doctor she would like the patient to see. Yaa Denis   Fax referral to [phone].  [address], [address] [facility]  Phone [phone]  or [facility]. Have the patient call back at 8:30 a.m. on   as they are booking new patients at that time. Call at 8:30 a.m.. Other patients will be calling. [address] [address] [address] [phone]    CCSS contacted the patient who was at an appointment. Will call back tomorrow. Plan:  Chart routed to [facility] for review.    CCSS Plan for follow-up call in 1-2 days

## 2023-08-01 ENCOUNTER — CARE COORDINATION (OUTPATIENT)
Dept: OTHER | Facility: CLINIC | Age: 33
End: 2023-08-01

## 2023-08-01 NOTE — CARE COORDINATION
23 Care Coordination  Note    Care Coordination  (CCSS), Suman George, [facility],  requesting Assistance with finding providers. yes - A sooner appointment with an in-network neurologist .     Charlette Anderson was contacted by patient via phone for follow up on referral listed above. verified name and  with patient as identifiers. Summary Note:   CCSS advised the patient that the doctor is back in the office as of . CCSS left a message for her advising that he would like to be seen by neurology sooner. Provided two offices that are booking sooner appointments. Waiting to hear back. Dr. Lizzie Stephen (referral is needed) and [facility]. Plan:  Chart routed to [facility] for review.    CCSS Plan for follow-up call in 1-2 days

## 2023-08-02 ENCOUNTER — CARE COORDINATION (OUTPATIENT)
Dept: OTHER | Facility: CLINIC | Age: 33
End: 2023-08-02

## 2023-08-02 NOTE — CARE COORDINATION
23 Care Coordination  Note    Care Coordination  (CCSS), Karolina Ortega, received referral from [facility], Leatha Lopez, NALLELY,  requesting Assistance with benefits related needs (network exception process, prior authorization, ect.) yes - Need to see if a waiver is in place for [facility]. In addition, Is PA required for EGD/Colonoscopy? CCSS contacted  Paulino Simeon with [employer] via phone, verified name and  with Paulino Simeon as identifiers. Summary Note:   Approved waiver is on file for Dr. Sandro Mohamud at [facility] 23 -23. Tier 2 benefit level. 20765 - should be covered at 100%. Deductible and out-of-pocket have been met. PA not required. [address]3612 - PA is also not required. Call reference number [id number]. CCSS contacted the office of Dr. Sandro Mohamud to verify the CPT codes. Verified the patient's name and  with Mary Jo Cochran. They are [phone] and [phone]. The patient hasn't been scheduled yet but procedures go to their precert dept first.     Per the encounter in the chart, Dr. Hussain Welch didn't refer the patient. Need to call whoever placed the referral  ED doctor Anjum Kyle PA-C. Emergency Medicine  No phone number listed  [address] [facility] received an incoming call from [place], at [facility]. UM number for Dr. Sandro Mohamud at [facility] is [phone]. Valid 23-23. PA is not required for [facility], [address] [facility] and [address]. Call ref [id number]       Plan:  Chart routed to [facility] for review.    CCSS Plan for follow-up call in 1-2 days         I

## 2023-08-02 NOTE — CARE COORDINATION
Ambulatory Care Coordination Note  2023    Patient Current Location:  Home: [address] [facility]  [address] [place] [address]     ACM contacted the patient by telephone. Verified name and  with patient as identifiers. Provided introduction to self, and explanation of the ACM role. Challenges to be reviewed by the provider   Additional needs identified to be addressed with provider: No  none               Method of communication with provider: none. ACM: Trenton Sorto LPN      Spoke to patient. Allergy appt  went well. Very mild reactions to raspberries and milk, nothing significant. Plan is to return in 2 months for environmental panel- patient was found to have multiple environmental allergens. Allergy shots were also discussed. GI appt at [facility]  with Dr. Elizabeth Reardon- went very well. His POC is below. Patient plans to get labs and stool studies at a [address] [place] [address] facility d/t cost. Once completed and resulted, AC will E-fax results to [facility] fax #[phone]. EGD/Colon were ordered, pending approval per Care Everywhere- patient will call GI to see if he can schedule these- may need OON waiver as he needs to get these done at [facility]. He will let me know what they say when he calls. Awaiting for PCP to send referral to [address] [place] [address] Neurology as they can get patient in for a sooner appt than 10/6. Many possibilities were discussed today with patient such as autoimmune disorders, bowel diseases, thyroid issues, myasthenia, ect. Pertaining to his s/s.      - Repeat labs and stool studies   - Would plan for repeat EGD with empiric dilation given ongoing swallowing symptoms and biopsy given unclear timeline for normalization in Esophageal eosinophils. It seems he did not tolerate PPI or steroid, but his esophageal eosinophilia improved on follow on esophageal biopsy 2022, curious to see if this will be persistent.    - Given weight loss and loose stools would plan for Colonoscopy with ileal intubation and random

## 2023-08-03 ENCOUNTER — CARE COORDINATION (OUTPATIENT)
Dept: OTHER | Facility: CLINIC | Age: 33
End: 2023-08-03

## 2023-08-03 NOTE — CARE COORDINATION
8/3/23 Care Coordination  Note    Care Coordination  (CCSS), Sp Eng, received referral from [facility], Radha Sahu, RN,  requesting Assistance with finding providers. yes - Need assisting with getting a sooner neurology appointment . CCSS was contacted by patient via phone for follow up on referral listed above. verified name and  with as identifiers. Summary Note:   Patient called to express a few concerns. Advised him to reach out to [employer], Karen Martin. CCSS provided encouragement. [facility] advised this CCSS that she reached out to Dr. Tasha Perez office to request a referral to neurology at [facility]. CCSS contacted Dr. Judi Young office. Verified patient's name and  with Lyndol Castleman. Advised that an OON Exception Waiver has to be completed for the GI doctor. CCSS will fax the form. Fax  [phone]. Faxed. Notified AC.         Plan:    Plan for follow-up call in 3-5 days

## 2023-08-03 NOTE — CARE COORDINATION
Ambulatory Care Coordination Note  8/3/2023    Patient Current Location:  Home: [address] [facility]  [address] [facility] [address]     ACM contacted the patient by telephone. Verified name and  with patient as identifiers. Provided introduction to self, and explanation of the ACM role. Challenges to be reviewed by the provider   Additional needs identified to be addressed with provider: Yes                  Method of communication with provider: phone. ACM: Oswald Bettencourt LPN    Spoke to patient. Expressed concern that he will be unable to get the referrals he needs to proceed. Acm contacted [facility] and Dr. Londono Search office (left message to see how far they are booking out)- [facility] booking out mid to end of October. Acm then contacted Dr. Shahram Abbott office at [facility] to see about him writing referral to Neuro at [address] [place] [address] sent him a message to get that referral. Once placed, they will call patient. Discussed with patient that we will attempt to have his specialists in [address] Adv if any further waivers are needed, we will assist with that process. Patient very appreciative of call. Care Coordination Interventions    Referral from Primary Care Provider: No  Suggested Interventions and Community Resources  Cardiac Rehab: In Process (Comment: Dr. Tianna Snider )  Disease Specific Clinic: Completed (Comment: Camila Pimentel- Dr. Saadia Sarkar)  Pulmonary Rehab: In Process (Comment[facility] )  Registered Dietician: In Process (Comment: Needs RD)  Social Work: Completed  Zone Management Tools: Completed       Future Appointments   Date Time Provider [address]   2023 10:45 AM MD ELLY Han   10/6/2023  1:15 PM Antonella Crump DO [facility] [facility] Coordinator  Associate Care Management  [address] [place] [address], [facility] [address]  Phone:

## 2023-08-03 NOTE — TELEPHONE ENCOUNTER
Would need appt since I have no ideal what this is for. Typical wait time to get in to neurology is about 3-4 months.

## 2023-08-03 NOTE — TELEPHONE ENCOUNTER
Patient notified, voiced understanding.  Patient stated his  is working on getting patient in sooner to see neurologist.

## 2023-08-03 NOTE — TELEPHONE ENCOUNTER
Called patient that I was unable to get ahold of his  about his referral he said the Neurology that was placed his  said there were cooper far back on scheduling  asked if Dr Jeanne Elizabeth would place a referral and maybe he can get into one soon.

## 2023-08-04 ENCOUNTER — HOSPITAL ENCOUNTER (OUTPATIENT)
Age: 33
Setting detail: SPECIMEN
Discharge: HOME | End: 2023-08-04
Payer: COMMERCIAL

## 2023-08-04 LAB
ASTROVIRUS PCR: NOT DETECTED
C CAYETANENSIS DNA SPEC QL NAA+PROBE: NOT DETECTED
CAMPY SP DNA.DIARRHEA STL QL NAA+PROBE: NOT DETECTED
CRYPTOSP DNA SPEC QL NAA+PROBE: NOT DETECTED
E COLI DNA SPEC QL NAA+PROBE: NOT DETECTED
E COLI ENTEROAGGREGATIVE PCR: NOT DETECTED
E COLI ENTEROPATHOGENIC PCR: NOT DETECTED
E COLI ENTEROTOXIGENIC PCR: NOT DETECTED
E COLI O157H7 DNA SPEC QL NAA+PROBE: NOT DETECTED
E HISTOLYT DNA SPEC QL NAA+PROBE: NOT DETECTED
EC STX1+STX2 + H7 FLIC SPEC NAA+PROBE: NOT DETECTED
G LAMBLIA DNA SPEC QL NAA+PROBE: NOT DETECTED
HADV DNA SPEC QL NAA+PROBE: NOT DETECTED
NOROVIRUS RNA SPEC QL NAA+PROBE: NOT DETECTED
P SHIGELLOIDES DNA STL QL NAA+PROBE: NOT DETECTED
RV RNA SPEC QL NAA+PROBE: NOT DETECTED
SALMONELLA DNA SPEC QL NAA+PROBE: NOT DETECTED
SAPOVIRUS PCR: NOT DETECTED
V CHOLERAE DNA SPEC QL NAA+PROBE: NOT DETECTED
VIBRIO DNA SPEC NAA+PROBE: NOT DETECTED
YERSINIA DNA SPEC NAA+PROBE: NOT DETECTED

## 2023-08-04 PROCEDURE — 87209 SMEAR COMPLEX STAIN: CPT

## 2023-08-04 PROCEDURE — 87177 OVA AND PARASITES SMEARS: CPT

## 2023-08-04 PROCEDURE — 87507 IADNA-DNA/RNA PROBE TQ 12-25: CPT

## 2023-08-08 ENCOUNTER — CARE COORDINATION (OUTPATIENT)
Dept: OTHER | Facility: CLINIC | Age: 33
End: 2023-08-08

## 2023-08-08 ENCOUNTER — HOSPITAL ENCOUNTER (OUTPATIENT)
Age: 33
Discharge: HOME | End: 2023-08-08
Payer: COMMERCIAL

## 2023-08-08 LAB
CRP SERPL HS-MCNC: 3 MG/L
ERYTHROCYTE SEDIMENTATION RATE: 2 MM/HR (ref 0–15)
IGA: 303 MG/DL (ref 69–382)
TSH SERPL DL<=0.005 MIU/L-ACNC: 2.62 UIU/ML (ref 0.27–4.2)

## 2023-08-08 PROCEDURE — 82784 ASSAY IGA/IGD/IGG/IGM EACH: CPT

## 2023-08-08 PROCEDURE — 85652 RBC SED RATE AUTOMATED: CPT

## 2023-08-08 PROCEDURE — 86140 C-REACTIVE PROTEIN: CPT

## 2023-08-08 PROCEDURE — 36415 COLL VENOUS BLD VENIPUNCTURE: CPT

## 2023-08-08 PROCEDURE — 84443 ASSAY THYROID STIM HORMONE: CPT

## 2023-08-08 PROCEDURE — 83516 IMMUNOASSAY NONANTIBODY: CPT

## 2023-08-08 NOTE — CARE COORDINATION
23 Care Coordination  Note    Care Coordination  (CCSS), Nadine Amin, received referral from [facility], Darien Zavala RN, requesting Assistance with benefits related needs (network exception process, prior authorization, ect.) yes - Need to f/u on the completion of the OON Exception Waiver for the upcoming neuro appointment . CCSS contacted Provider office, Dr. Des Arnold,  via phone, verified name and  with Angel as identifiers. Summary Note:   Angel stated that she see's the previous message regarding the [employer]. She will send a f/u message. A note, that was entered today,  states that the doctor will work on [employer] after the referral has been completed. Plan:  [address] for follow-up call in 3-5 days     Waiver was faxed on 8/3.

## 2023-08-09 ENCOUNTER — CARE COORDINATION (OUTPATIENT)
Dept: OTHER | Facility: CLINIC | Age: 33
End: 2023-08-09

## 2023-08-09 LAB — INTERPRETATION: NEGATIVE

## 2023-08-09 NOTE — CARE COORDINATION
23 Care Coordination  Note    Care Coordination  (CCSS), Jassi العراقي, received referral from AC, Corina Baldwin LPN,  requesting Assistance with finding providers. yes - Need to obtain referral for neurology from PCP . CCSS was contacted by Provider office, Dr. Liliane Gilbert,  via phone for follow up on referral listed above. verified name and  with Massimo Aguilar  as identifiers. Summary Note:   Massimo Aguilar stated that Dr. Liliane Gilbert is unable to initiate the neurology referral.  It has to be submitted by the patient's PCP. CCSS received an incoming call from the patient who addressed some concerns. Would like to be tested for staff, strep, and vasculitis. Also has concerns about getting the colonoscopy. He feels his worst when bowels move. CCSS advised that Dr. Liliane Gilbert is unable to do the neuro referral.  It has to be done by PCP. Advised that AC planned to call him today. She is currently on another call. Plan:  AC was notified.   CCSS Plan for follow-up call in 5-7 days

## 2023-08-09 NOTE — CARE COORDINATION
[address] [facility], [address] [place] [address] Street  Phone: [phone]  [email or web address]

## 2023-08-10 LAB
TTG IGA SER IA-ACNC: <2 U/ML (ref 0–3)
TTG IGG SER IA-ACNC: 3 U/ML (ref 0–5)

## 2023-08-15 ENCOUNTER — CARE COORDINATION (OUTPATIENT)
Dept: OTHER | Facility: CLINIC | Age: 33
End: 2023-08-15

## 2023-08-15 NOTE — CARE COORDINATION
Ambulatory Care Coordination Note  8/15/2023    Patient Current Location:  Home: [address] [place] [address]  [address] [place] [address]     ACM contacted the patient by telephone. Verified name and  with patient as identifiers. Provided introduction to self, and explanation of the ACM role. Challenges to be reviewed by the provider   Additional needs identified to be addressed with provider: Yes  See below               Method of communication with provider: phone. ACM: Tiffany Butler LPN    Patient reports s/s continue to worsen slowly over time. He saw his new PCP yesterday through [facility], really likes him. Had basic labs today, urine cx, will f/u in 2 weeks. ACM spoke to [facility], colon seperately is also booking out until January- waitlisted for procedure currently. [facility] rep advised me he can go elsewhere to have procedure, however, they cannot fax the order. Patient must  order and fax himself. He can still resume care under Dr. Lesly Abraham. ACM spoke to central scheduling-  for [address] to schedule colon- will await call back, unsure if I can schedule colon without order. Fax #[phone]. Plan and current s/s:  - Await call back from [address] [facility] [address] out patient scheduling for colon  - Next PCP appt, discuss POTS, MS, and Botulism.  - L sided flank pain radiating down into pelvic/gastric region  - Dysuria  - postural dizziness  - tachycardic episodes with SOB  - dizziness with feeling of blacking out  - feels worse later in the day  - muscle pains/aches      Care Coordination Interventions    Referral from Primary Care Provider: No  Suggested Interventions and Community Resources  Cardiac Rehab: In Process (Comment: Dr. Tammie Buerger )  Disease Specific Clinic: Completed (Comment: Karina Aguero- Dr. Andra Pineda)  Pulmonary Rehab: In Process (Comment: Dillon Mares)  Registered Dietician:  In Process (Comment:

## 2023-08-16 ENCOUNTER — CARE COORDINATION (OUTPATIENT)
Dept: OTHER | Facility: CLINIC | Age: 33
End: 2023-08-16

## 2023-08-16 NOTE — CARE COORDINATION
Ambulatory Care Coordination Note     ACMsent MyChart message for Associate Care Management follow up related to GI scheduling. See patient message for details and response (as appropriate). Plan:  Plan for follow-up call in 1-2 days. Plan for next call: follow-up appointment-Cintia Pierre, [facility] Coordinator  Associate Care Management  [address] [place] [address] Street  Phone: [phone]  [email or web address]

## 2023-08-17 ENCOUNTER — CARE COORDINATION (OUTPATIENT)
Dept: OTHER | Facility: CLINIC | Age: 33
End: 2023-08-17

## 2023-08-17 NOTE — CARE COORDINATION
Ambulatory Care Coordination Note  2023    Patient Current Location:  Home: [address] [facility]  [address] [id number]     ACM contacted the patient by telephone. Verified name and  with patient as identifiers. Provided introduction to self, and explanation of the ACM role. Challenges to be reviewed by the provider   Additional needs identified to be addressed with provider: No  none               Method of communication with provider: none. ACM: Daya Beltran LPN    Spoke to patient, he reports [facility] could get him in for initial eval in September and colon in October. He is thinking to go this route. He has PCP appt  to discuss. B12 and D were low, plans to discuss B12 intolerance(?) with PCP. In , B12 was 2,000+ but none found on hair sample. Now, it's low, PCP discussing B12 inj- adv him to mention this to PCP on Tuesday. Also discussed adding calorie/protein flavorless powder into drinks/foods. Will f/u early next week. Care Coordination Interventions    Referral from Primary Care Provider: No  Suggested Interventions and Community Resources  Cardiac Rehab: In Process (Comment: Dr. Yessi Crain )  Disease Specific Clinic: Completed (Comment: Elida Matt- Dr. Dylan Hill)  Pulmonary Rehab: In Process (Comment: Margarita Mojica)  Registered Dietician: In Process (Comment: Needs RD)  Social Work: Completed  Zone Management Tools: Completed          Goals Addressed                   This Visit's Progress     Conditions and Symptoms   On track     I will schedule office visits, as directed by my provider. I will notify my provider of any barriers to my plan of care. I will follow my Zone Management tool to seek urgent or emergent care. I will notify my provider of any symptoms that indicate a worsening of my condition.       Barriers: financial and overwhelmed by complexity of regimen  Plan for overcoming

## 2023-08-23 ENCOUNTER — CARE COORDINATION (OUTPATIENT)
Dept: OTHER | Facility: CLINIC | Age: 33
End: 2023-08-23

## 2023-08-23 NOTE — CARE COORDINATION
Incoming call from [facility]- 2 openings today at 1:00 and 1:20. Informed patient, he is able to attend- provided # for him to call ASAP. He will call me after his appt. Michelle Lacy, [address] [facility] Coordinator  Associate Care Management  [address] [place] [address], [address] [place], [place] [address]Th Street  Phone: [phone]  [email or web address]
utilize the resources provided to me by my ACM and notify my PCP or specialists or any new or worsening changes. Confidence: 10/10  Anticipated Goal Completion Date: 8/1/23                Future Appointments   Date Time Provider [address]   10/6/2023  1:15 PM Nicholas Orta DO [address] [facility] 30 Neurology [facility]. Isabell Eisenmenger, [address] [facility] Coordinator  Associate Care Management  [address] [place] [address] Street  Phone: [phone]  [email or web address]

## 2023-08-25 ENCOUNTER — CARE COORDINATION (OUTPATIENT)
Dept: OTHER | Facility: CLINIC | Age: 33
End: 2023-08-25

## 2023-08-25 NOTE — CARE COORDINATION
worsening changes. Confidence: 10/10  Anticipated Goal Completion Date: 8/1/23                Future Appointments   Date Time Provider [address]   10/6/2023  1:15 PM Iberia Sake, DO [address] Neurology -       Juan Guerrero. Ramy Gill, [address] [place] [address] Coordinator  Associate Care Management  [address] [place] [address] Street  Phone: [phone]  [email or web address]

## 2023-08-28 ENCOUNTER — CARE COORDINATION (OUTPATIENT)
Dept: OTHER | Facility: CLINIC | Age: 33
End: 2023-08-28

## 2023-09-01 ENCOUNTER — CARE COORDINATION (OUTPATIENT)
Dept: OTHER | Facility: CLINIC | Age: 33
End: 2023-09-01

## 2023-09-05 ENCOUNTER — CARE COORDINATION (OUTPATIENT)
Dept: OTHER | Facility: CLINIC | Age: 33
End: 2023-09-05

## 2023-09-05 NOTE — CARE COORDINATION
Ambulatory Care Coordination Note  2023    Patient Current Location:  Home: [address] [place] [address]  [address] [facility] [address]     ACM contacted the patient by telephone. Verified name and  with patient as identifiers. Provided introduction to self, and explanation of the ACM role. Challenges to be reviewed by the provider   Additional needs identified to be addressed with provider: Yes  See below               Method of communication with provider: phone. ACM: Tala Estrada LPN    Spoke to patient. He saw Dr Miguel Horne (PCP office) last 23, recommended he begin Erythromycin PO x1 month as Clarithromycin has helped his s/s before. He states he was unable to pick this up yet d/t needing a PC. Acm contacted [facility] to get the sig and amount- Erythromycin 200/5mL suspension- 6mL TID PO. Total quantity 600mL. Acm then [employer] as below:    \" Good Morning,      My patient below's PCP prescribed him Erythromycin 200/5mL suspension- 6mL TID PO with a total quantity of 600mL. He is to take this for Eosinophilic esophagitis (WAU-15 - K20.0), Unintentional weight loss (ICD-10 - R63.4), and Diarrhea, unspecified. He was told this needed a PA since it is a different dosing than the usual. He is to take this for 1 month. Patient: Omar Paula  Member ID: [id number]  : 1990    I was wondering if you see this as a PA yet on your side? If so, will this be approved or denied? Thanks! \"    Patient has 3rd B12 inj scheduled for . No s/e thus far, aware to give this more time to become effective. PCP also discussed Dupixant. He is considering pushing out his colon- PCP and GI told him they suspect that the colon will be WNL. Will await email back from [address] [place] [address]. Goals Addressed                   This Visit's Progress     Conditions and Symptoms   On track     I will schedule office visits, as directed by my provider. I will notify my provider of any barriers to my plan of care.   I

## 2023-09-05 NOTE — CARE COORDINATION
Ambulatory Care Coordination Note    ACM attempted to reach patient for follow up call regarding returning missed call- saw Dr Ignacia Diehl last week, upcoming colon. HIPAA compliant message left requesting a return phone call at patient convenience. Michelle Llanes, [address] [place] [address] Coordinator  Associate Care Management  [address] [place] [address], [address] [place] [address] Street  Phone: [phone]  [email or web address]

## 2023-09-07 ENCOUNTER — CARE COORDINATION (OUTPATIENT)
Dept: OTHER | Facility: CLINIC | Age: 33
End: 2023-09-07

## 2023-09-07 SDOH — ECONOMIC STABILITY: HOUSING INSECURITY: IN THE LAST 12 MONTHS, HOW MANY PLACES HAVE YOU LIVED?: 1

## 2023-09-07 SDOH — ECONOMIC STABILITY: FOOD INSECURITY: WITHIN THE PAST 12 MONTHS, THE FOOD YOU BOUGHT JUST DIDN'T LAST AND YOU DIDN'T HAVE MONEY TO GET MORE.: NEVER TRUE

## 2023-09-07 SDOH — ECONOMIC STABILITY: FOOD INSECURITY: WITHIN THE PAST 12 MONTHS, YOU WORRIED THAT YOUR FOOD WOULD RUN OUT BEFORE YOU GOT MONEY TO BUY MORE.: NEVER TRUE

## 2023-09-07 SDOH — ECONOMIC STABILITY: INCOME INSECURITY: IN THE LAST 12 MONTHS, WAS THERE A TIME WHEN YOU WERE NOT ABLE TO PAY THE MORTGAGE OR RENT ON TIME?: NO

## 2023-09-07 ASSESSMENT — SOCIAL DETERMINANTS OF HEALTH (SDOH)
HOW OFTEN DO YOU GET TOGETHER WITH FRIENDS OR RELATIVES?: ONCE A WEEK
HOW OFTEN DO YOU ATTENT MEETINGS OF THE CLUB OR ORGANIZATION YOU BELONG TO?: MORE THAN 4 TIMES PER YEAR
HOW HARD IS IT FOR YOU TO PAY FOR THE VERY BASICS LIKE FOOD, HOUSING, MEDICAL CARE, AND HEATING?: SOMEWHAT HARD
HOW OFTEN DO YOU ATTEND CHURCH OR RELIGIOUS SERVICES?: MORE THAN 4 TIMES PER YEAR
DO YOU BELONG TO ANY CLUBS OR ORGANIZATIONS SUCH AS CHURCH GROUPS UNIONS, FRATERNAL OR ATHLETIC GROUPS, OR SCHOOL GROUPS?: YES
IN A TYPICAL WEEK, HOW MANY TIMES DO YOU TALK ON THE PHONE WITH FAMILY, FRIENDS, OR NEIGHBORS?: THREE TIMES A WEEK

## 2023-09-07 ASSESSMENT — LIFESTYLE VARIABLES
HOW MANY STANDARD DRINKS CONTAINING ALCOHOL DO YOU HAVE ON A TYPICAL DAY: PATIENT DOES NOT DRINK
HOW OFTEN DO YOU HAVE A DRINK CONTAINING ALCOHOL: NEVER

## 2023-09-07 NOTE — CARE COORDINATION
Ambulatory Care Coordination Note    ACM attempted to reach PCP office for follow up call regarding status of Erythromycin PA. HIPAA compliant message left requesting a return phone call at PCP office convenience. \      Acm received incoming response from [employer] regarding Erythromycin rx: \"Hello,     I do not have an authorization on file for this antibiotic. The provider should be calling in the PA to the RX vendor, if this is an antibiotic that the member will obtain from the pharmacy. \"      Shayy Thorne. Sofi Davey, [address] [place] [address] Coordinator  Associate Care Management  [address] [place] [address]  Phone: [phone]  [email or web address]

## 2023-09-08 ENCOUNTER — CARE COORDINATION (OUTPATIENT)
Dept: OTHER | Facility: CLINIC | Age: 33
End: 2023-09-08

## 2023-09-08 NOTE — CARE COORDINATION
Ambulatory Care Coordination Note  2023    Patient Current Location:  Home: [address] [facility]  [address] [facility] [address]     ACM contacted the patient by telephone. Verified name and  with patient as identifiers. Provided introduction to self, and explanation of the ACM role. Challenges to be reviewed by the provider   Additional needs identified to be addressed with provider: Yes  Med PA               Method of communication with provider: phone. ACM: Kade Gifford LPN    Spoke to PCP office- nurse Reg Wooten states she is actively working on the [place] for patients Erythromycin. She will contact patient once completed and she hears back from [facility]. Patient informed of this. Patient reports he is going to call GI to see if they can run a test for a certain bacteria. He did cancel his colonoscopy. Future Appointments   Date Time Provider [address]   2023  8:00 AM [facility] ROOM 1 [address] [facility] Imaging   10/6/2023  1:15 PM Bashir Rose DO [address] Neurology -     Juliet Tavares. Laine Langley, [address] [place] [address] Coordinator  Associate Care Management  [address] [place] [address] Street  Phone: [phone]  [email or web address]

## 2023-09-11 ENCOUNTER — HOSPITAL ENCOUNTER (OUTPATIENT)
Dept: ULTRASOUND IMAGING | Age: 33
Discharge: HOME | End: 2023-09-11
Payer: COMMERCIAL

## 2023-09-11 DIAGNOSIS — R63.4 UNINTENTIONAL WEIGHT LOSS: ICD-10-CM

## 2023-09-11 DIAGNOSIS — K20.0 EOSINOPHILIC ESOPHAGITIS: ICD-10-CM

## 2023-09-11 PROCEDURE — 76705 ECHO EXAM OF ABDOMEN: CPT

## 2023-09-14 ENCOUNTER — CARE COORDINATION (OUTPATIENT)
Dept: OTHER | Facility: CLINIC | Age: 33
End: 2023-09-14

## 2023-09-15 NOTE — CARE COORDINATION
Ambulatory Care Coordination Note    ACM attempted to reach patient for follow up call regarding erythromycin, weight gainers, ABD US. HIPAA compliant message left requesting a return phone call at patient convenience. Michelle Cortes, [address] [place] [address] Coordinator  Associate Care Management  [address] [place] [address], [address] [place] [address]  Phone: [phone]  [email or web address]

## 2023-09-20 ENCOUNTER — CARE COORDINATION (OUTPATIENT)
Dept: OTHER | Facility: CLINIC | Age: 33
End: 2023-09-20

## 2023-09-20 NOTE — CARE COORDINATION
Ambulatory Care Coordination Note  2023    Patient Current Location:  Home: [address] [facility]  [address] [facility] [address]     ACM contacted the patient by telephone. Verified name and  with patient as identifiers. Provided introduction to self, and explanation of the ACM role. Challenges to be reviewed by the provider   Additional needs identified to be addressed with provider: No  none               Method of communication with provider: none. ACM: Dipti Cabrera LPN    Spoke to patient. He saw PCP last Friday, wants to start patient on Clarithromycin, see how he progresses, and possibly then start Erythromycin. Patient reports he prefers the suspension of Clarithromycin. ACM spoke to [address], suspension is $107+ and they have to order it and would need a new rx. The tablets are $0. Patient states he will crush the tablet and dissolve it in water, applesauce, ect. Patient has decided against Dupixent. Will f/u in 2 days on ATB progress. Goals Addressed                   This Visit's Progress     Conditions and Symptoms   On track     I will schedule office visits, as directed by my provider. I will notify my provider of any barriers to my plan of care. I will follow my Zone Management tool to seek urgent or emergent care. I will notify my provider of any symptoms that indicate a worsening of my condition. Barriers: financial and overwhelmed by complexity of regimen  Plan for overcoming my barriers: I will utilize the resources provided to me by my ACM and notify my PCP or specialists or any new or worsening changes. Confidence: 10/10  Anticipated Goal Completion Date: 23                Future Appointments   Date Time Provider [address] [place] [address]   10/6/2023  1:15 PM Javon Gregory DO [address] [facility] 30 Neurology -       Holley Brooks. June Diaz, [facility] Coordinator  Associate Care Management  [address] [place] [address], [address] [place] [address] Street  Phone: [phone]  [email or web address]

## 2023-09-22 ENCOUNTER — CARE COORDINATION (OUTPATIENT)
Dept: OTHER | Facility: CLINIC | Age: 33
End: 2023-09-22

## 2023-09-26 ENCOUNTER — CARE COORDINATION (OUTPATIENT)
Dept: OTHER | Facility: CLINIC | Age: 33
End: 2023-09-26

## 2023-09-26 RX ORDER — MELATONIN
COMMUNITY
Start: 2023-08-16

## 2023-10-06 ENCOUNTER — OFFICE VISIT (OUTPATIENT)
Dept: NEUROLOGY | Age: 33
End: 2023-10-06
Payer: COMMERCIAL

## 2023-10-06 VITALS
HEIGHT: 68 IN | DIASTOLIC BLOOD PRESSURE: 66 MMHG | OXYGEN SATURATION: 94 % | HEART RATE: 82 BPM | BODY MASS INDEX: 15.91 KG/M2 | SYSTOLIC BLOOD PRESSURE: 110 MMHG | WEIGHT: 105 LBS

## 2023-10-06 DIAGNOSIS — R56.9 SEIZURE-LIKE ACTIVITY (HCC): Primary | ICD-10-CM

## 2023-10-06 PROCEDURE — 1036F TOBACCO NON-USER: CPT | Performed by: PSYCHIATRY & NEUROLOGY

## 2023-10-06 PROCEDURE — G8418 CALC BMI BLW LOW PARAM F/U: HCPCS | Performed by: PSYCHIATRY & NEUROLOGY

## 2023-10-06 PROCEDURE — 99205 OFFICE O/P NEW HI 60 MIN: CPT | Performed by: PSYCHIATRY & NEUROLOGY

## 2023-10-06 PROCEDURE — G8427 DOCREV CUR MEDS BY ELIG CLIN: HCPCS | Performed by: PSYCHIATRY & NEUROLOGY

## 2023-10-06 PROCEDURE — G8484 FLU IMMUNIZE NO ADMIN: HCPCS | Performed by: PSYCHIATRY & NEUROLOGY

## 2023-10-10 ENCOUNTER — CARE COORDINATION (OUTPATIENT)
Dept: OTHER | Facility: CLINIC | Age: 33
End: 2023-10-10

## 2023-10-10 NOTE — CARE COORDINATION
Ambulatory Care Coordination Note  10/10/2023    Patient Current Location:  Home: [address] [place] [address] St  [address] [facility] [address]     ACM contacted the patient by telephone. Verified name and  with patient as identifiers. Provided introduction to self, and explanation of the ACM role. Challenges to be reviewed by the provider   Additional needs identified to be addressed with provider: No  none               Method of communication with provider: none. ACM: Trina Willingham LPN    Spoke to patient. He states his appt with Dr Lazarus Meade went very well. He is suspecting the cause of his s/s to mainly be from [facility] and/or possible vagus nerve . The only place to test to vagus nerve discrepancies is [place] in [place], but there is really no treatment for it. He is waiting to schedule his EEG to r/o seizure activity. He plans to discuss hyperbaric O2 therapies with Dr Lazarus Meade at next appt, as Darling Kwan will cover this with a referral/order. Goals Addressed                   This Visit's Progress     Conditions and Symptoms   On track     I will schedule office visits, as directed by my provider. I will notify my provider of any barriers to my plan of care. I will follow my Zone Management tool to seek urgent or emergent care. I will notify my provider of any symptoms that indicate a worsening of my condition. Barriers: financial and overwhelmed by complexity of regimen  Plan for overcoming my barriers: I will utilize the resources provided to me by my ACM and notify my PCP or specialists or any new or worsening changes. Confidence: 10/10  Anticipated Goal Completion Date: 23                Future Appointments   Date Time Provider [address] [place] [address]   2024  2:45 PM HARMAN Henderson - CNP [address] [address] Neurology [facility]. Tiffany Tony, [address] [facility] Coordinator  Associate Care Management  [address] [place] [address], [address] [place], [place] [address]Th Street  Phone: [phone]  [email or web address]

## 2023-10-24 ENCOUNTER — CARE COORDINATION (OUTPATIENT)
Dept: OTHER | Facility: CLINIC | Age: 33
End: 2023-10-24

## 2023-10-24 NOTE — CARE COORDINATION
10/24/23 Care Coordination  Note    No further outreach scheduled with Nettie Powell will sign off care team at this time. Patient will be further outreached by the ACM on the care team.     The patient is pleased with his new PCP.       Appt with Dr. Dorian Ordoñez was October 6th at 1:15 PM. The

## 2023-11-10 ENCOUNTER — HOSPITAL ENCOUNTER (OUTPATIENT)
Dept: SLEEP CENTER | Age: 33
Discharge: HOME | End: 2023-11-10
Attending: PSYCHIATRY & NEUROLOGY
Payer: COMMERCIAL

## 2023-11-10 DIAGNOSIS — R56.9 SEIZURE-LIKE ACTIVITY (HCC): ICD-10-CM

## 2023-11-10 PROCEDURE — 95819 EEG AWAKE AND ASLEEP: CPT

## 2023-11-10 NOTE — PROGRESS NOTES
Routine outpatient EEG completed. EEG is now awaiting interpretation. Physician has been notified via [facility].

## 2023-11-13 ENCOUNTER — CARE COORDINATION (OUTPATIENT)
Dept: OTHER | Facility: CLINIC | Age: 33
End: 2023-11-13

## 2023-11-13 NOTE — CARE COORDINATION
Ambulatory Care Coordination Note    ACM attempted to reach patient for follow up call regarding hyperbaric O2 therapy?, EEG results, s/s review. HIPAA compliant message left requesting a return phone call at patient convenience. Michelle Corcoran, [address] [place] [address] Coordinator  Associate Care Management  [address] [place] [address], [address] [place] [address]  Phone: [phone]  [email or web address]

## (undated) DEVICE — Z DISCONTINUED (USE MFG CAT MVABO)  TUBING GAS SAMPLING STD 6.5 FT FEMALE CONN SMRT CAPNOLINE

## (undated) DEVICE — FORCEPS BX L240CM JAW DIA2.8MM L CAP W/ NDL MIC MESH TOOTH